# Patient Record
Sex: FEMALE | HISPANIC OR LATINO | Employment: FULL TIME | URBAN - METROPOLITAN AREA
[De-identification: names, ages, dates, MRNs, and addresses within clinical notes are randomized per-mention and may not be internally consistent; named-entity substitution may affect disease eponyms.]

---

## 2017-02-01 ENCOUNTER — OFFICE VISIT (OUTPATIENT)
Dept: OBSTETRICS AND GYNECOLOGY | Facility: CLINIC | Age: 37
End: 2017-02-01
Payer: MEDICAID

## 2017-02-01 VITALS
HEIGHT: 64 IN | BODY MASS INDEX: 34.48 KG/M2 | SYSTOLIC BLOOD PRESSURE: 118 MMHG | DIASTOLIC BLOOD PRESSURE: 80 MMHG | WEIGHT: 201.94 LBS

## 2017-02-01 DIAGNOSIS — O09.522 AMA (ADVANCED MATERNAL AGE) MULTIGRAVIDA 35+, SECOND TRIMESTER: ICD-10-CM

## 2017-02-01 DIAGNOSIS — O09.32 LATE PRENATAL CARE AFFECTING PREGNANCY IN SECOND TRIMESTER: Primary | ICD-10-CM

## 2017-02-01 PROCEDURE — 99999 PR PBB SHADOW E&M-EST. PATIENT-LVL II: CPT | Mod: PBBFAC,,, | Performed by: OBSTETRICS & GYNECOLOGY

## 2017-02-01 PROCEDURE — 99212 OFFICE O/P EST SF 10 MIN: CPT | Mod: PBBFAC | Performed by: OBSTETRICS & GYNECOLOGY

## 2017-02-01 PROCEDURE — 99203 OFFICE O/P NEW LOW 30 MIN: CPT | Mod: TH,S$PBB,, | Performed by: OBSTETRICS & GYNECOLOGY

## 2017-02-01 NOTE — MR AVS SNAPSHOT
[facility]  [address] [place] [address]  Phone: [phone]  Fax: [phone]                  Yuliana Mueller   2017 9:00 AM   Office Visit    Descripción:  Female : 1980   Personal Médico:  Marcio Patel MD   Departamento:  [facility]           Razón de la britta     Possible Pregnancy           Diagnósticos de Esta Visita        Comentarios    Late prenatal care affecting pregnancy in second trimester    -  Primario     AMA (advanced maternal age) multigravida 35+, second trimester                Lista de tareas           Citas próximas        Personal Médico Departamento Tfno del dpto    2017 1:20 PM LABORATORY, [address] [facility] [phone]    2017 1:20 PM SPECIMEN, [address] [facility] [phone]    2017 1:30 PM ULTRASOUND, OB-GYN [facility]/ -[phone]    2017 2:15 PM Marcio Patel MD [facility]/ -[phone]      Metas (5 Years of Data)     Ninguna      Follow-Up and Disposition     Return in about 1 week (around 2017) for Initial OB with CNM.    Follow-up and Disposition History      [facility]     [facility] de Enfermeras - Asistencia   Enfermeras registradas de [facility] pueden ayudarle a reservar jelly britta, proveer educación para la albino, asesoría clínica, y otros servicios de asesoramiento.   Llame para alyssia servicio gratuito a [phone].             Medicamentos           Mensaje sobre Medicamentos     Verificar los cambios y / o adiciones a templeton régimen de medicación son los mismos que discutir con templeton médico. Si cualquiera de estos cambios o adiciones son incorrectos, por favor notifique a templeton proveedor de atención médica.             Verifique que la siguiente lista de medicamentos es jelly representación exacta de los medicamentos que está tomando actualmente. Si no hay ningunos reportados, la lista puede estar en ballesteros. Si no  "es correcta, por favor póngase en contacto con templeton proveedor de atención médica. Lleve esta lista con usted en neetu de emergencia.                Información de referencia clínica           Signos vitales - más recientes  Última actualización: 2/1/2017  9:19 AM por Adore Fletcher, LPN    PS [place] Pes[place] LMP (última salome) BMI (IMC)       118/80 (BP Location: Right arm, Patient Position: Sitting, BP Method: Manual) 5' 4" (1.626 m) 91.6 kg (201 lb 15.1 oz) 08/08/2016 34.66 kg/m2       Blood Pressure          Most Recent Value    BP  118/80      Alergias     A partir del:  2/1/2017        No Known Allergies      Vacunas     Administradas en la fecha de la visita:  2/1/2017        None      Orders Placed During Today's Visit     Exámenes/Procedimientos futuros Se espera el Vence    CBC auto differential  2/1/2017 2/1/2018    Hemoglobin A1c  2/1/2017 4/2/2018    Hepatitis panel, acute  2/1/2017 2/1/2018    HIV-1 and HIV-2 antibodies  2/1/2017 2/1/2018    RPR  2/1/2017 2/1/2018    Rubella antibody, IgG  2/1/2017 2/1/2018    Type & Screen  2/1/2017 2/1/2018    Urine culture  2/1/2017 2/1/2018    US OB/GYN Procedure (Viewpoint)  As directed 2/1/2018      Registrarse para [facility]     La activación de templeton cuenta [facility] es tan fácil feli 1-2-3!    1) Ir a [email or web address], seleccione Registrarse Ahora, meter el código de activación y templeton fecha de nacimiento, y seleccione Próximo.    [id number]  Expires: 3/18/2017 10:12 AM      2) Crear un nombre de usuario y contraseña para usar cuando se visita [facility] en el futuro y selecciona jelly pregunta de seguridad en neetu de que pierda templeton contraseña y seleccione Próximo.    3) Introduzca templeton dirección de correo electrónico y saray car en Registrarse!    Información Adicional  Si tiene alguna pregunta, por favor, e-mail [email or web address] o llame al [phone] para hablar con nuestro personal. Recuerde, [facility] no debe ser usada para necesidades urgentes. En neetu de " emergencia médica, [facility] 911.                 Yuliana Mueller   2017 9:00 AM   Office Visit    Description:  Female : 1980   Provider:  Marcio Patel MD   Department:  [facility]           Reason for Visit     Possible Pregnancy           Diagnoses this Visit        Comments    Late prenatal care affecting pregnancy in second trimester    -  Primary     AMA (advanced maternal age) multigravida 35+, second trimester                To Do List           Future Appointments        Provider Department Dept Phone    2017 1:20 PM LABORATORY, [address] [facility] [phone]    2017 1:20 PM SPECIMEN, [address] [facility] [phone]    2017 1:30 PM ULTRASOUND, OB-GYN [facility] OB/ -[phone]    2017 2:15 PM Marcio Patel MD [facility] OB/ -[phone]      Goals     None      Follow-Up and Disposition     Return in about 1 week (around 2017) for Initial OB with CNM.    Follow-up and Disposition History      [facility]     [facility] Nurse Care [facility] -  Assistance  Registered nurses in the [facility] provide clinical advisement, health education, appointment booking, and other advisory services.  Call for this free service at [phone].             Medications           Message regarding Medications     Verify the changes and/or additions to your medication regime listed below are the same as discussed with your clinician today.  If any of these changes or additions are incorrect, please notify your healthcare provider.             Verify that the below list of medications is an accurate representation of the medications you are currently taking.  If none reported, the list may be blank. If incorrect, please contact your healthcare provider. Carry this list with you in case of emergency.                Clinical Reference Information           Vital Signs - Last Recorded  Most recent update:  "2/1/2017  9:19 AM by Adore Fletcher LPN    BP Ht Wt LMP BMI       118/80 (BP Location: Right arm, Patient Position: Sitting, BP Method: Manual) 5' 4" (1.626 m) 91.6 kg (201 lb 15.1 oz) 08/08/2016 34.66 kg/m2       Blood Pressure          Most Recent Value    BP  118/80      Allergies as of 2/1/2017     No Known Allergies      Immunizations Administered on Date of Encounter - 2/1/2017     None      Orders Placed During Today's Visit     Future Labs/Procedures Expected by Expires    CBC auto differential  2/1/2017 2/1/2018    Hemoglobin A1c  2/1/2017 4/2/2018    Hepatitis panel, acute  2/1/2017 2/1/2018    HIV-1 and HIV-2 antibodies  2/1/2017 2/1/2018    RPR  2/1/2017 2/1/2018    Rubella antibody, IgG  2/1/2017 2/1/2018    Type & Screen  2/1/2017 2/1/2018    Urine culture  2/1/2017 2/1/2018    US OB/GYN Procedure ([facility])  As directed 2/1/2018      [facility] Sign-Up     Activating your [facility] account is as easy as 1-2-3!     1) Visit [email or web address], select Sign Up Now, enter this activation code and your date of birth, then select Next.  [email or web address]  Expires: 3/18/2017 10:12 AM      2) Create a username and password to use when you visit [facility] in the future and select a security question in case you lose your password and select Next.    3) Enter your e-mail address and click Sign Up!    Additional Information  If you have questions, please e-mail [email or web address] or call [phone] to talk to our [facility] staff. Remember, [facility] is NOT to be used for urgent needs. For medical emergencies, dial 911.           "

## 2017-02-01 NOTE — PROGRESS NOTES
CHIEF COMPLAINT:   Patient presents with      Possible Pregnancy        HISTORY OF PRESENT ILLNESS  Yuliana Mueller 36 y.o.  presents for pregnancy risk assessment.   The patient has no complaints today.  No nausea or vomiting. No bleeding or pain.  Pregnancy was not planned but is desired.  Boyfriend is supportive of pregnancy.  Lives at home with boyfriend (4 children live in [place]).  No pets at home.  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  Denies domestic abuse.  Denies chemical/pesticide/radiation exposure.  OB history:  1.   at term no complications in [place]  2.   at term no complications in [place]  3.   at term no complications in [place]  4.   at term no complications in [place]    LMP: Patient's last menstrual period was 2016.  EDC: Estimated Date of Delivery: 5/15/17  EGA: 25w2d       There are no preventive care reminders to display for this patient.    History reviewed. No pertinent past medical history.    History reviewed. No pertinent past surgical history.    History reviewed. No pertinent family history.    Social History     Social History    Marital status: Single     Spouse name: N/A    Number of children: N/A    Years of education: N/A     Social History Main Topics    Smoking status: Never Smoker    Smokeless tobacco: Never Used    Alcohol use No    Drug use: No    Sexual activity: Yes     Partners: Male     Birth control/ protection: None     Other Topics Concern    None     Social History Narrative    None       No current outpatient prescriptions on file.     No current facility-administered medications for this visit.        Review of patient's allergies indicates:  No Known Allergies      PHYSICAL EXAM   Vitals:    17 0914   BP: 118/80        PAIN SCALE: 0/10 None    PHYSICAL EXAM    ROS:  GENERAL: No fever, chills, fatigability or weight loss.  CV: Denies chest pain  PULM: Denies shortness of breath or wheezing.  ABDOMEN:  Appetite fine. No weight loss. Denies diarrhea, abdominal pain, hematemesis or blood in stool.  URINARY: No flank pain, dysuria or hematuria.  REPRODUCTIVE: No abnormal vaginal bleeding.       PE:   APPEARANCE: Well nourished, well developed, in no acute distress  CHEST: Clear to auscultation bilaterally  CV: Regular rate and rhythm  BREASTS: Symmetrical, no skin changes or visible lesions. No palpable masses, nipple discharge or adenopathy bilaterally.  ABDOMEN: Soft. No tenderness or masses. No hepatosplenomegaly. No hernias  PELVIC: Deferred      bpm    A/P:  Late prenatal care affecting pregnancy in second trimester  -     CBC auto differential; Future; Expected date: 2/1/17  -     Hepatitis panel, acute; Future; Expected date: 2/1/17  -     HIV-1 and HIV-2 antibodies; Future; Expected date: 2/1/17  -     RPR; Future; Expected date: 2/1/17  -     Rubella antibody, IgG; Future; Expected date: 2/1/17  -     Type & Screen; Future; Expected date: 2/1/17  -     Hemoglobin A1c; Future; Expected date: 2/1/17  -     Urine culture; Future; Expected date: 2/1/17  -     US OB/GYN Procedure ([facility]point); Future  -      Patient was counseled today on A.C.S. Pap guidelines and recommendations for yearly pelvic exams, mammograms and monthly self breast exams; to see her PCP for other health maintenance and pregnancy.   -      Patient's medications and medical history reviewed with patient and implications in pregnancy.   -      Pregnancy course discussed and 'AtoZ' book given. Patient was counseled on proper weight gain based on the [place] [employer]'s recommendations based on her pre-pregnancy weight. Discussed foods to avoid in pregnancy (i.e. sushi, fish that are high in mercury, deli meat, and unpasteurized cheeses). Discussed prenatal vitamin options (i.e. stool softener, DHA). Discussed potential medical problems in pregnancy.  -     Discussed risk of Toxoplasmosis transmission from pets and reviewed risk  reduction techniques.  -     Pt was counseled on exercise in pregnancy and weight gain recommendations.  -     Pt was counseled on travel recommendations and on risks of Zika virus exposure.  Current CDC Zika advisories and prevention techniques were reviewed with pt.  Pt denies any recent international travel and does not plan travel during pregnancy.  Pt reports that partner does not plan travel either.  -     Oriented to practice incl CNM collaboration.   -     Follow-up initial OB, w/labs and u/s.     AMA (advanced maternal age) multigravida 35+, second trimester  -     Discused increased risks with AMA status.  -     Chromosomal abnormality risk discussed and available testing offered - too late.     Return in about 1 week (around 2/8/2017) for Initial OB with CNM.

## 2017-02-08 ENCOUNTER — PROCEDURE VISIT (OUTPATIENT)
Dept: OBSTETRICS AND GYNECOLOGY | Facility: CLINIC | Age: 37
End: 2017-02-08
Payer: MEDICAID

## 2017-02-08 ENCOUNTER — LAB VISIT (OUTPATIENT)
Dept: LAB | Facility: HOSPITAL | Age: 37
End: 2017-02-08
Attending: OBSTETRICS & GYNECOLOGY
Payer: MEDICAID

## 2017-02-08 ENCOUNTER — INITIAL PRENATAL (OUTPATIENT)
Dept: OBSTETRICS AND GYNECOLOGY | Facility: CLINIC | Age: 37
End: 2017-02-08
Payer: MEDICAID

## 2017-02-08 VITALS
WEIGHT: 203.25 LBS | BODY MASS INDEX: 34.89 KG/M2 | DIASTOLIC BLOOD PRESSURE: 80 MMHG | SYSTOLIC BLOOD PRESSURE: 110 MMHG

## 2017-02-08 DIAGNOSIS — O09.32 LATE PRENATAL CARE AFFECTING PREGNANCY IN SECOND TRIMESTER: ICD-10-CM

## 2017-02-08 DIAGNOSIS — Z36.89 ENCOUNTER FOR FETAL ANATOMIC SURVEY: ICD-10-CM

## 2017-02-08 DIAGNOSIS — O09.32 LATE PRENATAL CARE AFFECTING PREGNANCY IN SECOND TRIMESTER: Primary | ICD-10-CM

## 2017-02-08 DIAGNOSIS — O09.522 ELDERLY MULTIGRAVIDA IN SECOND TRIMESTER: ICD-10-CM

## 2017-02-08 DIAGNOSIS — K21.9 GASTROESOPHAGEAL REFLUX DISEASE WITHOUT ESOPHAGITIS: ICD-10-CM

## 2017-02-08 LAB
ABO + RH BLD: NORMAL
BASOPHILS # BLD AUTO: 0.02 K/UL
BASOPHILS NFR BLD: 0.2 %
BLD GP AB SCN CELLS X3 SERPL QL: NORMAL
DIFFERENTIAL METHOD: ABNORMAL
EOSINOPHIL # BLD AUTO: 0.1 K/UL
EOSINOPHIL NFR BLD: 0.9 %
ERYTHROCYTE [DISTWIDTH] IN BLOOD BY AUTOMATED COUNT: 13.2 %
HCT VFR BLD AUTO: 34.9 %
HGB BLD-MCNC: 12 G/DL
LYMPHOCYTES # BLD AUTO: 1.7 K/UL
LYMPHOCYTES NFR BLD: 14.4 %
MCH RBC QN AUTO: 28.9 PG
MCHC RBC AUTO-ENTMCNC: 34.4 %
MCV RBC AUTO: 84 FL
MONOCYTES # BLD AUTO: 0.9 K/UL
MONOCYTES NFR BLD: 7.4 %
NEUTROPHILS # BLD AUTO: 8.8 K/UL
NEUTROPHILS NFR BLD: 76.8 %
PLATELET # BLD AUTO: 276 K/UL
PMV BLD AUTO: 10.3 FL
RBC # BLD AUTO: 4.15 M/UL
WBC # BLD AUTO: 11.45 K/UL

## 2017-02-08 PROCEDURE — 85025 COMPLETE CBC W/AUTO DIFF WBC: CPT

## 2017-02-08 PROCEDURE — 86850 RBC ANTIBODY SCREEN: CPT

## 2017-02-08 PROCEDURE — 99999 PR PBB SHADOW E&M-EST. PATIENT-LVL II: CPT | Mod: PBBFAC,,, | Performed by: OBSTETRICS & GYNECOLOGY

## 2017-02-08 PROCEDURE — 86592 SYPHILIS TEST NON-TREP QUAL: CPT

## 2017-02-08 PROCEDURE — 86762 RUBELLA ANTIBODY: CPT

## 2017-02-08 PROCEDURE — 80074 ACUTE HEPATITIS PANEL: CPT

## 2017-02-08 PROCEDURE — 83036 HEMOGLOBIN GLYCOSYLATED A1C: CPT

## 2017-02-08 PROCEDURE — 76805 OB US >/= 14 WKS SNGL FETUS: CPT | Mod: PBBFAC | Performed by: OBSTETRICS & GYNECOLOGY

## 2017-02-08 PROCEDURE — 86900 BLOOD TYPING SEROLOGIC ABO: CPT

## 2017-02-08 PROCEDURE — 76805 OB US >/= 14 WKS SNGL FETUS: CPT | Mod: 26,S$PBB,, | Performed by: OBSTETRICS & GYNECOLOGY

## 2017-02-08 PROCEDURE — 99213 OFFICE O/P EST LOW 20 MIN: CPT | Mod: TH,S$PBB,, | Performed by: OBSTETRICS & GYNECOLOGY

## 2017-02-08 PROCEDURE — 86703 HIV-1/HIV-2 1 RESULT ANTBDY: CPT

## 2017-02-08 NOTE — PROGRESS NOTES
US today: 1149 g (94%), cephalic, 3VC, posterior placenta, female, sub-optimal views  Pt is here for her IOB exam.  Pt reports complaints of acid reflux symptoms, especially when laying down at night.  Otherwise doing well.    Risk assessment note reviewed:  x 4  Pt counseled on reflux prevention and dietary restrictions.  Use OTC Zantac 150 mg QHS.    See physical exam tab for exam details.  Gc/Ct done.  Draw labs today and draw GTT with next visit.  Repeat ultrasound in 4 weeks for sub-optimal views.  Welcome [facility].  Coffective counseling sheet Build Your Team discussed with mother. Reinforced importance of early identification of support team including champion, OB provider, pediatrician and local community resources. Encouraged mother to download Coffective mobile maura if she has not already done so.  Mother verbalizes understanding.  Coffective counseling sheet Get Ready discussed with mother. Reinforced avoiding induction of labor unless medically indicated as well as comfort measures during labor.  Encouraged mother to download Coffective mobile maura if she has not already done so. Mother verbalizes understanding.  Coffective counseling sheet Fall In Love discussed with mother. Reinforced immediate skin to skin, the magic first hour, importance of the first feeding and delaying routine procedures. Encouraged mother to download Coffective mobile maura if she has not already done so. Mother verbalizes understanding.

## 2017-02-08 NOTE — MR AVS SNAPSHOT
[facility]  [address] [place] [address]  Phone: [phone]  Fax: [phone]                  Yuliana M Jewell Mueller   2017 2:15 PM   Initial Prenatal    Descripción:  Female : 1980   Personal Médico:  Marcio Patel MD   Departamento:  [facility]           Razón de la britta     Initial Prenatal Visit           Diagnósticos de Esta Visita        Comentarios    Late prenatal care affecting pregnancy in second trimester    -  Primario     Elderly multigravida in second trimester         Gastroesophageal reflux disease without esophagitis                Lista de tareas           Citas próximas        Personal Médico Departamento Tfno del dpto    2017 9:15 AM Marcio Patel MD [facility]/ -[phone]      Metas (5 Years of Data)     Ninguna      Follow-Up and Disposition     Return in about 2 weeks (around 2017).      [facility]     [facility] Enfermeras - Asistencia   Enfermeras registradas de [facility] pueden ayudarle a reservar jelly britta, proveer educación para la albino, asesoría clínica, y otros servicios de asesoramiento.   Llame para alyssia servicio gratuito a [phone].             Medicamentos           Mensaje sobre Medicamentos     Verificar los cambios y / o adiciones a templeton régimen de medicación son los mismos que discutir con templeton médico. Si cualquiera de estos cambios o adiciones son incorrectos, por favor notifique a templeton proveedor de atención médica.             Verifique que la siguiente lista de medicamentos es jelly representación exacta de los medicamentos que está tomando actualmente. Si no hay ningunos reportados, la lista puede estar en ballesteros. Si no es correcta, por favor póngase en contacto con templeton proveedor de atención médica. Lleve esta lista con usted en neetu de emergencia.                Información de referencia clínica           Prenatal Vitals     Enc. Date GA Prenatal Vitals Prenatal Pulse  Pain Level Urine Albumin/Glucose Edema Presentation Dilation/Effacement/Station    2/8/17 26w2d 110/80 / 92.2 kg (203 lb 4.2 oz)  / us 140 / Present  0           Number of fetuses: 1       Frida signos vitales emilia     PS Peso Ultima menstruación BMI (IMC)          110/80 92.2 kg (203 lb 4.2 oz) 08/08/2016 34.89 kg/m2        Alergias     A partir del:  2/8/2017        No Known Allergies      Vacunas     Administradas en la fecha de la visita:  2/8/2017        None      Orders Placed During Today's Visit     Exámenes/Procedimientos futuros Se espera el Vence    OB Glucose Screen  2/8/2017 4/9/2018      Registrarse para [facility]     La activación de templeton cuenta [facility] es tan fácil feli 1-2-3!    1) Ir a [email or web address], seleccione Registrarse Ahora, meter el código de activación y templeton fecha de nacimiento, y seleccione Próximo.    [id number]  Expires: 3/18/2017 10:12 AM      2) Crear un nombre de usuario y contraseña para usar cuando se visita [facility] en el futuro y selecciona jelly pregunta de seguridad en neetu de que pierda templeton contraseña y seleccione Próximo.    3) Introduzca templeton dirección de correo electrónico y saray [facility] en Registrarse!    Información Adicional  Si tiene alguna pregunta, por favor, e-mail [email or web address] o llame al [phone] para hablar con nuestro personal. Recuerde, [facility] no debe ser usada para necesidades urgentes. En neetu de emergencia médica, llame al 911.        Language Assistance Services     ATTENTION: Language assistance services are available, free of charge. Please call [phone].      ATENCIÓN: Si habla español, tiene a templeton disposición servicios gratuitos de asistencia lingüística. Llame al [phone].     SANDEEP Ý: N?u b?n nói Ti?ng Vi?t, có các d?ch v? h? tr? ngôn ng? mi?n phí dành cho b?n. G?i s? [phone].         [facility] cumple con las leyes federales aplicables de derechos civiles y no discrimina por motivos de tracey, color, origen nacional,  magali, garcia, o sexo.                 Yuliana Corcoran Ervin   2017 2:15 PM   Initial Prenatal    Description:  Female : 1980   Provider:  Marcio Patel MD   Department:  [facility]           Reason for Visit     Initial Prenatal Visit           Diagnoses this Visit        Comments    Late prenatal care affecting pregnancy in second trimester    -  Primary     Elderly multigravida in second trimester         Gastroesophageal reflux disease without esophagitis                To Do List           Future Appointments        Provider Department Dept Phone    2017 9:15 AM Marcio Patel MD [facility]/ -[phone]      Goals     None      Follow-Up and Disposition     Return in about 2 weeks (around 2017).      [facility]     [facility] On [facility] Nurse Care Line -  Assistance  Registered nurses in the [facility] provide clinical advisement, health education, appointment booking, and other advisory services.  Call for this free service at [phone].             Medications           Message regarding Medications     Verify the changes and/or additions to your medication regime listed below are the same as discussed with your clinician today.  If any of these changes or additions are incorrect, please notify your healthcare provider.             Verify that the below list of medications is an accurate representation of the medications you are currently taking.  If none reported, the list may be blank. If incorrect, please contact your healthcare provider. Carry this list with you in case of emergency.                Clinical Reference Information           Prenatal Vitals     Enc. Date GA Prenatal Vitals Prenatal Pulse Pain Level Urine Albumin/Glucose Edema Presentation Dilation/Effacement/Station    17 26w2d 110/80 / 92.2 kg (203 lb 4.2 oz)  / us 140 / Present  0           Number of fetuses: 1       Your Vitals Were     BP Weight Last Period BMI           110/80 92.2 kg (203 lb 4.2 oz) 08/08/2016 34.89 kg/m2        Allergies as of 2/8/2017     No Known Allergies      Immunizations Administered on Date of Encounter - 2/8/2017     None      Orders Placed During Today's Visit     Future Labs/Procedures Expected by Expires    OB Glucose Screen  2/8/2017 4/9/2018      [facility] Sign-Up     Activating your [facility] account is as easy as 1-2-3!     1) Visit [email or web address], select Sign Up Now, enter this activation code and your date of birth, then select Next.  [email or web address]  Expires: 3/18/2017 10:12 AM      2) Create a username and password to use when you visit [facility] in the future and select a security question in case you lose your password and select Next.    3) Enter your e-mail address and click Sign Up!    Additional Information  If you have questions, please e-mail [email or web address] or call [phone] to talk to our [facility] staff. Remember, [facility] is NOT to be used for urgent needs. For medical emergencies, dial 911.         Language Assistance Services     ATTENTION: Language assistance services are available, free of charge. Please call [phone].      ATENCIÓN: Si habla español, tiene a templeton disposición servicios gratuitos de asistencia lingüística. Llame al [phone].     CHÚ Ý: N?u b?n nói Ti?ng Vi?t, có các d?ch v? h? tr? ngôn ng? mi?n phí dành cho b?n. G?i s? [phone].         [facility] complies with applicable Federal civil rights laws and does not discriminate on the basis of race, color, national origin, age, disability, or sex.

## 2017-02-09 LAB
ESTIMATED AVG GLUCOSE: 97 MG/DL
HAV IGM SERPL QL IA: NEGATIVE
HBA1C MFR BLD HPLC: 5 %
HBV CORE IGM SERPL QL IA: NEGATIVE
HBV SURFACE AG SERPL QL IA: NEGATIVE
HCV AB SERPL QL IA: NEGATIVE
HIV 1+2 AB+HIV1 P24 AG SERPL QL IA: NEGATIVE
RPR SER QL: NORMAL
RUBV IGG SER-ACNC: 26.8 IU/ML
RUBV IGG SER-IMP: REACTIVE

## 2017-02-10 LAB
C TRACH DNA SPEC QL NAA+PROBE: NEGATIVE
N GONORRHOEA DNA SPEC QL NAA+PROBE: NEGATIVE

## 2017-02-17 ENCOUNTER — TELEPHONE (OUTPATIENT)
Dept: OBSTETRICS AND GYNECOLOGY | Facility: CLINIC | Age: 37
End: 2017-02-17

## 2017-02-17 NOTE — TELEPHONE ENCOUNTER
Attempted to reach patient. Patient does not have phone. Advised  that her lab work was normal , she will relate the message to the patient.

## 2017-02-17 NOTE — TELEPHONE ENCOUNTER
----- Message from Marcio Patel MD sent at 2/10/2017  1:13 PM CST -----  Please contact pt to inform that test results are within normal range.  Keep scheduled appt.

## 2017-02-24 ENCOUNTER — ROUTINE PRENATAL (OUTPATIENT)
Dept: OBSTETRICS AND GYNECOLOGY | Facility: CLINIC | Age: 37
End: 2017-02-24
Payer: MEDICAID

## 2017-02-24 VITALS
WEIGHT: 203.25 LBS | SYSTOLIC BLOOD PRESSURE: 120 MMHG | DIASTOLIC BLOOD PRESSURE: 76 MMHG | BODY MASS INDEX: 34.89 KG/M2

## 2017-02-24 DIAGNOSIS — O09.33 LATE PRENATAL CARE AFFECTING PREGNANCY IN THIRD TRIMESTER: Primary | ICD-10-CM

## 2017-02-24 DIAGNOSIS — O09.523 ELDERLY MULTIGRAVIDA IN THIRD TRIMESTER: ICD-10-CM

## 2017-02-24 DIAGNOSIS — Z23 NEED FOR TDAP VACCINATION: ICD-10-CM

## 2017-02-24 PROCEDURE — 99212 OFFICE O/P EST SF 10 MIN: CPT | Mod: TH,S$PBB,, | Performed by: OBSTETRICS & GYNECOLOGY

## 2017-02-24 PROCEDURE — 90715 TDAP VACCINE 7 YRS/> IM: CPT | Mod: PBBFAC,PO | Performed by: OBSTETRICS & GYNECOLOGY

## 2017-02-24 PROCEDURE — 99999 PR PBB SHADOW E&M-EST. PATIENT-LVL II: CPT | Mod: PBBFAC,,, | Performed by: OBSTETRICS & GYNECOLOGY

## 2017-02-24 PROCEDURE — 99212 OFFICE O/P EST SF 10 MIN: CPT | Mod: PBBFAC,PO | Performed by: OBSTETRICS & GYNECOLOGY

## 2017-02-24 PROCEDURE — 90471 IMMUNIZATION ADMIN: CPT | Mod: PBBFAC,PO | Performed by: OBSTETRICS & GYNECOLOGY

## 2017-02-24 NOTE — PROGRESS NOTES
Pt reports no complaints.  Doing well.   Labs reviewed.  Pt did not complete GTT.  Will need to have this done soon.    3rd trimester talk.  Pt counseled on Tdap.  Desires to get it today.  Labor precautions and kick counts.

## 2017-02-24 NOTE — MR AVS SNAPSHOT
[facility] - OB/ GYN  [address] [place] [address]  Phone: [phone]  Fax: [phone]                  Yuliana M Jewell Mueller   2017 10:30 AM   Routine Prenatal    Descripción:  Female : 1980   Personal Médico:  Marcio Patel MD   Departamento:  [facility] - [facility]/ [facility]           Razón de la britta     Routine Prenatal Visit           Diagnósticos de Esta Visita        Comentarios    Late prenatal care affecting pregnancy in third trimester    -  Primario     Elderly multigravida in third trimester                Lista de tareas           Citas próximas        Personal Médico Departamento Tfno del [facility]    3/3/2017 10:50 AM LABORATORY, [facility] [phone]    3/10/2017 1:00 PM Marcio Patel MD [facility] OB/ -[phone]      Metas (5 Years of Data)     Ninguna      Follow-Up and Disposition     Return in about 2 weeks (around 3/10/2017).      [facility]     [facility] Línea de Enfermeras - Asistencia   Enfermeras registradas de [facility] pueden ayudarle a reservar jelly britta, proveer educación para la albino, asesoría clínica, y otros servicios de asesoramiento.   Llame para alyssia servicio gratuito a [phone].             Medicamentos           Mensaje sobre Medicamentos     Verificar los cambios y / o adiciones a templeton régimen de medicación son los mismos que discutir con templeton médico. Si cualquiera de estos cambios o adiciones son incorrectos, por favor notifique a templeton proveedor de atención médica.             Verifique que la siguiente lista de medicamentos es jelly representación exacta de los medicamentos que está tomando actualmente. Si no hay ningunos reportados, la lista puede estar en ballesteros. Si no es correcta, por favor póngase en contacto con templeton proveedor de atención médica. Lleve esta lista con usted en neetu de emergencia.                Información de referencia clínica           Prenatal Vitals     Enc. Date GA Prenatal Vitals Prenatal  Pulse Pain Level Urine Albumin/Glucose Edema Presentation Dilation/Effacement/Station    2/24/17 28w4d 120/76 / 92.2 kg (203 lb 4.2 oz)  / 140 / Present          2/8/17 26w2d 110/80 / 92.2 kg (203 lb 4.2 oz)  / us 140 / Present  0           Number of fetuses: 1       Frida signos vitales emilia     PS                   120/76           Alergias     A partir del:  2/24/2017        No Known Allergies      Vacunas     Administradas en la fecha de la visita:  2/24/2017        None      Registrarse para [facility]     La activación de templeton cuenta [facility] es tan fácil feli 1-2-3!    1) Ir a [email or web address], seleccione Registrarse Ahora, meter el código de activación y templeton fecha de nacimiento, y seleccione Próximo.    [id number]  Expires: 3/18/2017 10:12 AM      2) Crear un nombre de usuario y contraseña para usar cuando se visita [facility] en el futuro y selecciona jelly pregunta de seguridad en neetu de que pierda templeton contraseña y seleccione Próximo.    3) Introduzca templeton dirección de correo electrónico y saray clic en Registrarse!    Información Adicional  Si tiene alguna pregunta, por favor, e-mail [email or web address] o llame al [phone] para hablar con nuestro personal. Recuerde, [facility] no debe ser usada para necesidades urgentes. En neetu de emergencia médica, llame al 911.        Language Assistance Services     ATTENTION: Language assistance services are available, free of charge. Please call [phone].      ATENCIÓN: Si habla español, tiene a templeton disposición servicios gratuitos de asistencia lingüística. Llame al [phone].     CHÚ Ý: N?u b?n nói Ti?ng Vi?t, có các d?ch v? h? tr? ngôn ng? mi?n phí dành cho b?n. G?i s? [phone].         [facility] - [facility]/ [facility] cumple con las leyes federales aplicables de derechos civiles y no discrimina por motivos de tracey, color, origen nacional, edad, discapacidad, o sexo.                 Yuliana Muellre   2/24/2017 10:30 AM   Routine Prenatal    Description:   Female : 1980   Provider:  Mracio Patel MD   Department:  [facility] - OB/ GYN           Reason for Visit     Routine Prenatal Visit           Diagnoses this Visit        Comments    Late prenatal care affecting pregnancy in third trimester    -  Primary     Elderly multigravida in third trimester                To Do List           Future Appointments        Provider Department Dept Phone    3/3/2017 10:50 AM LABORATORY, [facility] [phone]    3/10/2017 1:00 PM Marcio Patel MD [facility] OB/ -[phone]      Goals     None      Follow-Up and Disposition     Return in about 2 weeks (around 3/10/2017).      [facility]     [facility] Nurse Care Line -  Assistance  Registered nurses in the [facility] provide clinical advisement, health education, appointment booking, and other advisory services.  Call for this free service at [phone].             Medications           Message regarding Medications     Verify the changes and/or additions to your medication regime listed below are the same as discussed with your clinician today.  If any of these changes or additions are incorrect, please notify your healthcare provider.             Verify that the below list of medications is an accurate representation of the medications you are currently taking.  If none reported, the list may be blank. If incorrect, please contact your healthcare provider. Carry this list with you in case of emergency.                Clinical Reference Information           Prenatal Vitals     Enc. Date GA Prenatal Vitals Prenatal Pulse Pain Level Urine Albumin/Glucose Edema Presentation Dilation/Effacement/Station    17 28w4d 120/76 / 92.2 kg (203 lb 4.2 oz)  / 140 / Present          17 26w2d 110/80 / 92.2 kg (203 lb 4.2 oz)  / us 140 / Present  0           Number of fetuses: 1       Your Vitals Were     BP                   120/76           Allergies as of 2017      No Known Allergies      Immunizations Administered on Date of Encounter - 2/24/2017     None      [facility] Sign-Up     Activating your [facility] account is as easy as 1-2-3!     1) Visit [email or web address], select Sign Up Now, enter this activation code and your date of birth, then select Next.  [email or web address]  Expires: 3/18/2017 10:12 AM      2) Create a username and password to use when you visit [facility] in the future and select a security question in case you lose your password and select Next.    3) Enter your e-mail address and click Sign Up!    Additional Information  If you have questions, please e-mail [email or web address] or call [phone] to talk to our [facility] staff. Remember, [facility] is NOT to be used for urgent needs. For medical emergencies, dial 911.         Language Assistance Services     ATTENTION: Language assistance services are available, free of charge. Please call [phone].      ATENCIÓN: Si habla español, tiene a templeton disposición servicios gratuitos de asistencia lingüística. Llame al [phone].     CHÚ Ý: N?u b?n nói Ti?ng Vi?t, có các d?ch v? h? tr? ngôn ng? mi?n phí dành cho b?n. G?i s? [phone].         [facility] - [facility]/ [facility] complies with applicable Federal civil rights laws and does not discriminate on the basis of race, color, national origin, age, disability, or sex.

## 2017-03-06 ENCOUNTER — LAB VISIT (OUTPATIENT)
Dept: LAB | Facility: HOSPITAL | Age: 37
End: 2017-03-06
Attending: OBSTETRICS & GYNECOLOGY
Payer: MEDICAID

## 2017-03-06 DIAGNOSIS — O09.32 LATE PRENATAL CARE AFFECTING PREGNANCY IN SECOND TRIMESTER: ICD-10-CM

## 2017-03-06 LAB — GLUCOSE SERPL-MCNC: 119 MG/DL

## 2017-03-06 PROCEDURE — 82950 GLUCOSE TEST: CPT

## 2017-03-06 PROCEDURE — 36415 COLL VENOUS BLD VENIPUNCTURE: CPT | Mod: PO

## 2017-03-21 ENCOUNTER — ROUTINE PRENATAL (OUTPATIENT)
Dept: OBSTETRICS AND GYNECOLOGY | Facility: CLINIC | Age: 37
End: 2017-03-21
Payer: MEDICAID

## 2017-03-21 VITALS
SYSTOLIC BLOOD PRESSURE: 110 MMHG | BODY MASS INDEX: 35.65 KG/M2 | DIASTOLIC BLOOD PRESSURE: 82 MMHG | WEIGHT: 207.69 LBS

## 2017-03-21 DIAGNOSIS — O09.523 ELDERLY MULTIGRAVIDA IN THIRD TRIMESTER: ICD-10-CM

## 2017-03-21 DIAGNOSIS — O09.33 LATE PRENATAL CARE AFFECTING PREGNANCY IN THIRD TRIMESTER: Primary | ICD-10-CM

## 2017-03-21 PROCEDURE — 99999 PR PBB SHADOW E&M-EST. PATIENT-LVL II: CPT | Mod: PBBFAC,,, | Performed by: OBSTETRICS & GYNECOLOGY

## 2017-03-21 PROCEDURE — 99212 OFFICE O/P EST SF 10 MIN: CPT | Mod: TH,S$PBB,, | Performed by: OBSTETRICS & GYNECOLOGY

## 2017-03-21 PROCEDURE — 99212 OFFICE O/P EST SF 10 MIN: CPT | Mod: PBBFAC,PO | Performed by: OBSTETRICS & GYNECOLOGY

## 2017-03-21 NOTE — PROGRESS NOTES
Pt reports no complaints.  Doing well.  Hospital delivery consents reviewed with pt and signed.  Begin weekly BPP with next visit (AMA).  Pt counseled on PP fertility planning.  Pt plans on following with health unit as she will lose insurance coverage promptly after delivery.  Labor precautions and kick counts.  Coffective counseling sheet Nourish discussed with mother. Reinforced basic breastfeeding position and latch as well as proper hand expression technique. Encouraged mother to download Coffective mobile maura if she has not already done so.  Mother verbalizes understanding.

## 2017-03-21 NOTE — MR AVS SNAPSHOT
[facility] OB/ GYN  [address]  [place] [address]  Phone: [phone]  Fax: [phone]                  Yuliana Corcoran Ervin   3/21/2017 2:15 PM   Routine Prenatal    Descripción:  Female : 1980   Personal Médico:  Marcio Patel MD   Departamento:  [facility] - [facility]/ [facility]           Razón de la britta     Routine Prenatal Visit           Diagnósticos de Esta Visita        Comentarios    Late prenatal care affecting pregnancy in third trimester    -  Primario     Elderly multigravida in third trimester                Lista de tareas           Citas próximas        Personal Médico Departamento Tfno del dpto    3/21/2017 2:15 PM Marcio Patel MD [facility] OB/ -[phone]    2017 1:00 PM ULTRASOUND, OB-GYN [facility] OB/ -[phone]    2017 1:30 PM Marcio Patel MD [facility] OB/ -[phone]      Metas (5 Years of Data)     Ninguna      Follow-Up and Disposition     Return in about 2 weeks (around 2017).      [facility]     [facility] Línea de Enfermeras - Asistencia   Enfermeras registradas de [facility] pueden ayudarle a reservar jelly britta, proveer educación para la albino, asesoría clínica, y otros servicios de asesoramiento.   Llame para alyssia servicio gratuito a [phone].             Medicamentos           Mensaje sobre Medicamentos     Verificar los cambios y / o adiciones a templeton régimen de medicación son los mismos que discutir con templeton médico. Si cualquiera de estos cambios o adiciones son incorrectos, por favor notifique a templeton proveedor de atención médica.             Verifique que la siguiente lista de medicamentos es jelly representación exacta de los medicamentos que está tomando actualmente. Si no hay ningunos reportados, la lista puede estar en ballesteros. Si no es correcta, por favor póngase en contacto con templeton proveedor de atención médica. Lleve esta lista con usted en neetu de emergencia.                Información de referencia clínica            Prenatal Vitals     Enc. Date GA Prenatal Vitals Prenatal Pulse Pain Level Urine Albumin/Glucose Edema Presentation Dilation/Effacement/Station    3/21/17 32w1d 110/82 / 94.2 kg (207 lb 10.8 oz)  / 140 / Present  0        2/24/17 28w4d 120/76 / 92.2 kg (203 lb 4.2 oz)  / 140 / Present          2/8/17 26w2d 110/80 / 92.2 kg (203 lb 4.2 oz)  / us 140 / Present  0           Number of fetuses: 1       Frida signos vitales emilia     PS Peso Ultima menstruación BMI (IMC)          110/82 94.2 kg (207 lb 10.8 oz) 08/08/2016 35.65 kg/m2        Alergias     A partir del:  3/21/2017        No Known Allergies      Vacunas     Administradas en la fecha de la visita:  3/21/2017        None      Orders Placed During Today's Visit     Exámenes de laboratorio recurrentes Intervalo Vence    US OB/GYN Procedure ([facility])  Once a week until 3/21/2018 3/21/2018      Registrarse para [facility]     La activación de templeton cuenta [facility] es tan fácil feli 1-2-3!    1) Ir a [email or web address], seleccione Registrarse Ahora, meter el código de activación y templeton fecha de nacimiento, y seleccione Próximo.    [id number]  Expires: 5/5/2017  1:53 PM      2) Crear un nombre de usuario y contraseña para usar cuando se visita [facility] en el futuro y selecciona jelly pregunta de seguridad en neetu de que pierda templeton contraseña y seleccione Próximo.    3) Introduzca templeton dirección de correo electrónico y saray clharshil en Registrarse!    Información Adicional  Si tiene alguna pregunta, por favor, e-mail [email or web address] o llame al [phone] para hablar con nuestro personal. Recuerde, [facility] no debe ser usada para necesidades urgentes. En neetu de emergencia médica, llame al 911.        Language Assistance Services     ATTENTION: Language assistance services are available, free of charge. Please call [phone].      ATENCIÓN: Si habla español, tiene a templeton disposición servicios gratuitos de asistencia lingüística. Llame al [phone].     CHÚ Ý:  N?u b?n nói Ti?ng Vi?t, có các d?ch v? h? tr? ngôn ng? mi?n phí dành cho b?n. G?i s? [phone].         [facility] - [facility]/ G[facility] cumple con las leyes federales aplicables de derechos civiles y no discrimina por motivos de tracey, color, origen nacional, edad, discapacidad, o sexo.                 Yuliana ELLIOTT Corcoran Ervin   3/21/2017 2:15 PM   Routine Prenatal    Description:  Female : 1980   Provider:  Marcio Patel MD   Department:  [facility] - [facility]/ [facility]           Reason for Visit     Routine Prenatal Visit           Diagnoses this Visit        Comments    Late prenatal care affecting pregnancy in third trimester    -  Primary     Elderly multigravida in third trimester                To Do List           Future Appointments        Provider Department Dept Phone    3/21/2017 2:15 PM Marcio Patel MD [facility] OB/ -[phone]    2017 1:00 PM ULTRASOUND, OB-GYN [facility] OB/ -[phone]    2017 1:30 PM Marcio Patel MD [facility] OB/ -[phone]      Goals     None      Follow-Up and Disposition     Return in about 2 weeks (around 2017).      [facility]     [facility] -  Assistance  Registered nurses in the [facility] On Call Center provide clinical advisement, health education, appointment booking, and other advisory services.  Call for this free service at [phone].             Medications           Message regarding Medications     Verify the changes and/or additions to your medication regime listed below are the same as discussed with your clinician today.  If any of these changes or additions are incorrect, please notify your healthcare provider.             Verify that the below list of medications is an accurate representation of the medications you are currently taking.  If none reported, the list may be blank. If incorrect, please contact your healthcare provider. Carry this list with you in case of emergency.                Clinical Reference  Information           Prenatal Vitals     Enc. Date GA Prenatal Vitals Prenatal Pulse Pain Level Urine Albumin/Glucose Edema Presentation Dilation/Effacement/Station    3/21/17 32w1d 110/82 / 94.2 kg (207 lb 10.8 oz)  / 140 / Present  0        2/24/17 28w4d 120/76 / 92.2 kg (203 lb 4.2 oz)  / 140 / Present          2/8/17 26w2d 110/80 / 92.2 kg (203 lb 4.2 oz)  / us 140 / Present  0           Number of fetuses: 1       Your Vitals Were     BP Weight Last Period BMI          110/82 94.2 kg (207 lb 10.8 oz) 08/08/2016 35.65 kg/m2        Allergies as of 3/21/2017     No Known Allergies      Immunizations Administered on Date of Encounter - 3/21/2017     None      Orders Placed During Today's Visit     Recurring Lab Work Interval Expires    US OB/GYN Procedure ([facility])  Once a week until 3/21/2018 3/21/2018      [facility] Sign-Up     Activating your [facility] account is as easy as 1-2-3!     1) Visit [email or web address], select Sign Up Now, enter this activation code and your date of birth, then select Next.  [email or web address]  Expires: 5/5/2017  1:53 PM      2) Create a username and password to use when you visit [facility] in the future and select a security question in case you lose your password and select Next.    3) Enter your e-mail address and click Sign Up!    Additional Information  If you have questions, please e-mail [email or web address] or call [phone] to talk to our [facility] staff. Remember, [facility] is NOT to be used for urgent needs. For medical emergencies, dial 911.         Language Assistance Services     ATTENTION: Language assistance services are available, free of charge. Please call [phone].      ATENCIÓN: Si habla español, tiene a templeton disposición servicios gratuitos de asistencia lingüística. Llame al [phone].     CHÚ Ý: N?u b?n nói Ti?ng Vi?t, có các d?ch v? h? tr? ngôn ng? mi?n phí dành cho b?n. G?i s? [phone].         [facility] - [facility]/ [facility] complies with applicable Federal  civil rights laws and does not discriminate on the basis of race, color, national origin, age, disability, or sex.

## 2017-04-04 ENCOUNTER — PROCEDURE VISIT (OUTPATIENT)
Dept: OBSTETRICS AND GYNECOLOGY | Facility: CLINIC | Age: 37
End: 2017-04-04
Payer: MEDICAID

## 2017-04-04 ENCOUNTER — ROUTINE PRENATAL (OUTPATIENT)
Dept: OBSTETRICS AND GYNECOLOGY | Facility: CLINIC | Age: 37
End: 2017-04-04
Payer: MEDICAID

## 2017-04-04 VITALS — WEIGHT: 208.56 LBS | SYSTOLIC BLOOD PRESSURE: 110 MMHG | BODY MASS INDEX: 35.8 KG/M2 | DIASTOLIC BLOOD PRESSURE: 68 MMHG

## 2017-04-04 DIAGNOSIS — O09.33 LATE PRENATAL CARE AFFECTING PREGNANCY IN THIRD TRIMESTER: ICD-10-CM

## 2017-04-04 DIAGNOSIS — O09.523 ELDERLY MULTIGRAVIDA IN THIRD TRIMESTER: Primary | ICD-10-CM

## 2017-04-04 DIAGNOSIS — O09.523 ELDERLY MULTIGRAVIDA IN THIRD TRIMESTER: ICD-10-CM

## 2017-04-04 PROCEDURE — 99212 OFFICE O/P EST SF 10 MIN: CPT | Mod: TH,S$PBB,, | Performed by: OBSTETRICS & GYNECOLOGY

## 2017-04-04 PROCEDURE — 99999 PR PBB SHADOW E&M-EST. PATIENT-LVL II: CPT | Mod: PBBFAC,,, | Performed by: OBSTETRICS & GYNECOLOGY

## 2017-04-04 PROCEDURE — 76819 FETAL BIOPHYS PROFIL W/O NST: CPT | Mod: 26,S$PBB,, | Performed by: OBSTETRICS & GYNECOLOGY

## 2017-04-04 PROCEDURE — 99212 OFFICE O/P EST SF 10 MIN: CPT | Mod: PBBFAC,PO | Performed by: OBSTETRICS & GYNECOLOGY

## 2017-04-04 NOTE — MR AVS SNAPSHOT
[facility]/ [facility]  [address] [place] [address]  Phone: [phone]  Fax: [phone]                  Yuliana Mueller   2017 1:30 PM   Routine Prenatal    Descripción:  Female : 1980   Personal Médico:  Marcio Patel MD   Departamento:  [facility]           Razón de la britta     Routine Prenatal Visit           Diagnósticos de Esta Visita        Comentarios    Elderly multigravida in third trimester    -  Primario     Late prenatal care affecting pregnancy in third trimester                Lista de tareas           Citas próximas        Personal Médico Departamento Tfno del dpto    2017 1:00 PM ULTRASOUND, OB-GYN [facility] OB/ -[phone]    2017 2:15 PM Marcio Patel MD [facility] OB/ -[phone]    2017 12:30 PM ULTRASOUND, OB-GYN [facility] OB/ -[phone]    2017 1:15 PM Marcio Patel MD [facility] OB/ -[phone]      Metas (5 Years of Data)     Ninguna      Follow-Up and Disposition     Return in about 2 weeks (around 2017).      [facility]     [facility] L[facility] de Enfermeras - Asistencia   Enfermeras registradas de [facility] pueden ayudarle a reservar jelly britta, proveer educación para la albino, asesoría clínica, y otros servicios de asesoramiento.   Llame para alyssia servicio gratuito a [phone].             Medicamentos           Mensaje sobre Medicamentos     Verificar los cambios y / o adiciones a templeton régimen de medicación son los mismos que discutir con templeton médico. Si cualquiera de estos cambios o adiciones son incorrectos, por favor notifique a templeton proveedor de atención médica.             Verifique que la siguiente lista de medicamentos es jelly representación exacta de los medicamentos que está tomando actualmente. Si no hay ningunos reportados, la lista puede estar en ballesteros. Si no es correcta, por favor póngase en contacto con templeton proveedor de atención médica. Lleve esta lista con usted en  neetu de emergencia.                Información de referencia clínica           Prenatal Vitals     Enc. Date GA Prenatal Vitals Prenatal Pulse Pain Level Urine Albumin/Glucose Edema Presentation Dilation/Effacement/Station    4/4/17 35w2d 110/68 / 94.6 kg (208 lb 8.9 oz) 34 cm / us 155 / Present     Vertex     3/21/17 32w1d 110/82 / 94.2 kg (207 lb 10.8 oz)  / 140 / Present  0        2/24/17 28w4d 120/76 / 92.2 kg (203 lb 4.2 oz)  / 140 / Present          2/8/17 26w2d 110/80 / 92.2 kg (203 lb 4.2 oz)  / us 140 / Present  0           Number of fetuses: 1       Frida signos vitales emilia     PS Peso Ultima menstruación BMI (IMC)          110/68 94.6 kg (208 lb 8.9 oz) 08/08/2016 35.8 kg/m2        Alergias     A partir del:  4/4/2017        No Known Allergies      Vacunas     Administradas en la fecha de la visita:  4/4/2017        None      Registrarse para [facility]     La activación de templeton cuenta [facility] es tan fácil feli 1-2-3!    1) Ir a [email or web address], seleccione Registrarse Ahora, meter el código de activación y templeton fecha de nacimiento, y seleccione Próximo.    [id number]  Expires: 5/5/2017  1:53 PM      2) Crear un nombre de usuario y contraseña para usar cuando se visita [facility] en el futuro y selecciona jelly pregunta de seguridad en neetu de que pierda templeton contraseña y seleccione Próximo.    3) Introduzca templeton dirección de correo electrónico y saray car en Registrarse!    Información Adicional  Si tiene alguna pregunta, por favor, e-mail [email or web address] o [email or web address] al [phone] para hablar con nuestro personal. Recuerde, [facility] no debe ser usada para necesidades urgentes. En neetu de emergencia médica, llame al 911.        Language Assistance Services     ATTENTION: Language assistance services are available, free of charge. Please call [phone].      ATENCIÓN: Si habla español, tiene a templeton disposición servicios gratuitos de asistencia lingüística. Llame al [phone].     CHÚ Ý: N?u b?n nói  Ti?ng Vi?t, có các d?ch v? h? tr? ngôn ng? mi?n phí dành cho b?n. G?i s? [phone].         [facility] cumple con las leyes federales aplicables de derechos civiles y no discrimina por motivos de tracey, color, origen nacional, edad, discapacidad, o sexo.                 Yuliana ELLIOTT Corcoran Ervin   2017 1:30 PM   Routine Prenatal    Description:  Female : 1980   Provider:  Marcio Patel MD   Department:  [facility]           Reason for Visit     Routine Prenatal Visit           Diagnoses this Visit        Comments    Elderly multigravida in third trimester    -  Primary     Late prenatal care affecting pregnancy in third trimester                To Do List           Future Appointments        Provider Department Dept Phone    2017 1:00 PM ULTRASOUND, OB-GYN [facility] OB/ -[phone]    2017 2:15 PM Marcio Patel MD [facility] OB/ -[phone]    2017 12:30 PM ULTRASOUND, OB-GYN [facility] OB/ -[phone]    2017 1:15 PM Marcio Patel MD [facility] OB/ -[phone]      Goals     None      Follow-Up and Disposition     Return in about 2 weeks (around 2017).      [facility]     [facility] On Call Nurse Care Line -  Assistance  Unless otherwise directed by your provider, please contact [facility] On-Call, our nurse care line that is available for  assistance.     Registered nurses in the [facility] provide: appointment scheduling, clinical advisement, health education, and other advisory services.  Call: [phone] (toll free)               Medications           Message regarding Medications     Verify the changes and/or additions to your medication regime listed below are the same as discussed with your clinician today.  If any of these changes or additions are incorrect, please notify your healthcare provider.             Verify that the below list of medications is an accurate representation of the medications you are  currently taking.  If none reported, the list may be blank. If incorrect, please contact your healthcare provider. Carry this list with you in case of emergency.                Clinical Reference Information           Prenatal Vitals     Enc. Date GA Prenatal Vitals Prenatal Pulse Pain Level Urine Albumin/Glucose Edema Presentation Dilation/Effacement/Station    4/4/17 35w2d 110/68 / 94.6 kg (208 lb 8.9 oz) 34 cm / us 155 / Present     Vertex     3/21/17 32w1d 110/82 / 94.2 kg (207 lb 10.8 oz)  / 140 / Present  0        2/24/17 28w4d 120/76 / 92.2 kg (203 lb 4.2 oz)  / 140 / Present          2/8/17 26w2d 110/80 / 92.2 kg (203 lb 4.2 oz)  / us 140 / Present  0           Number of fetuses: 1       Your Vitals Were     BP Weight Last Period BMI          110/68 94.6 kg (208 lb 8.9 oz) 08/08/2016 35.8 kg/m2        Allergies as of 4/4/2017     No Known Allergies      Immunizations Administered on Date of Encounter - 4/4/2017     None      [facility] Sign-Up     Activating your [facility] account is as easy as 1-2-3!     1) Visit [email or web address], select Sign Up Now, enter this activation code and your date of birth, then select Next.  [email or web address]  Expires: 5/5/2017  1:53 PM      2) Create a username and password to use when you visit [facility] in the future and select a security question in case you lose your password and select Next.    3) Enter your e-mail address and click Sign Up!    Additional Information  If you have questions, please e-mail [email or web address] or call [phone] to talk to our [facility] staff. Remember, [facility] is NOT to be used for urgent needs. For medical emergencies, dial 911.         Language Assistance Services     ATTENTION: Language assistance services are available, free of charge. Please call [phone].      ATENCIÓN: Si habla español, tiene a templeton disposición servicios gratuitos de asistencia lingüística. Llame al [phone].     SANDEEP Ý: N?u b?n nói Ti?ng Vi?t, có các d?ch  v? h? tr? ngôn ng? mi?n phí sparkleh cho b?n. G?i s? [phone].         [facility] complies with applicable Federal civil rights laws and does not discriminate on the basis of race, color, national origin, age, disability, or sex.

## 2017-04-04 NOTE — PROGRESS NOTES
US today:  2887 g (65%), BPP 8/8, AMA 16.2, MVP 6.2, Cephalic (pt was redated as reported dating was not using correct MATTY; correct MATTY 5/7/17 by 27 wk ultrasound; pt initially reported incorrect LMP and now is reporting new approximate date of 7/4/16).  Pt reports no complaints.  Doing well.  Labor precautions and kick counts.  Continue weekly BPP.  GBS with next visit.

## 2017-04-11 ENCOUNTER — PROCEDURE VISIT (OUTPATIENT)
Dept: OBSTETRICS AND GYNECOLOGY | Facility: CLINIC | Age: 37
End: 2017-04-11
Payer: MEDICAID

## 2017-04-11 ENCOUNTER — ROUTINE PRENATAL (OUTPATIENT)
Dept: OBSTETRICS AND GYNECOLOGY | Facility: CLINIC | Age: 37
End: 2017-04-11
Payer: MEDICAID

## 2017-04-11 VITALS
DIASTOLIC BLOOD PRESSURE: 62 MMHG | WEIGHT: 210.75 LBS | SYSTOLIC BLOOD PRESSURE: 112 MMHG | BODY MASS INDEX: 36.18 KG/M2

## 2017-04-11 DIAGNOSIS — O09.523 ELDERLY MULTIGRAVIDA IN THIRD TRIMESTER: ICD-10-CM

## 2017-04-11 DIAGNOSIS — O09.33 LATE PRENATAL CARE AFFECTING PREGNANCY IN THIRD TRIMESTER: Primary | ICD-10-CM

## 2017-04-11 PROCEDURE — 99212 OFFICE O/P EST SF 10 MIN: CPT | Mod: PBBFAC,PO | Performed by: OBSTETRICS & GYNECOLOGY

## 2017-04-11 PROCEDURE — 87081 CULTURE SCREEN ONLY: CPT

## 2017-04-11 PROCEDURE — 99999 PR PBB SHADOW E&M-EST. PATIENT-LVL II: CPT | Mod: PBBFAC,,, | Performed by: OBSTETRICS & GYNECOLOGY

## 2017-04-11 PROCEDURE — 76819 FETAL BIOPHYS PROFIL W/O NST: CPT | Mod: 26,S$PBB,, | Performed by: OBSTETRICS & GYNECOLOGY

## 2017-04-11 PROCEDURE — 99213 OFFICE O/P EST LOW 20 MIN: CPT | Mod: TH,S$PBB,, | Performed by: OBSTETRICS & GYNECOLOGY

## 2017-04-11 NOTE — MR AVS SNAPSHOT
[facility]/ [facility]  [address]  [place] [address]  Phone: [phone]  Fax: [phone]                  Yuliana Mueller   2017 2:15 PM   Routine Prenatal    Descripción:  Female : 1980   Personal Médico:  Marcio Patel MD   Departamento:  [facility]           Razón de la britta     Routine Prenatal Visit           Diagnósticos de Esta Visita        Comentarios    Late prenatal care affecting pregnancy in third trimester    -  Primario     Elderly multigravida in third trimester                Lista de tareas           Citas próximas        Personal Médico Departamento Tfno del dpto    2017 2:15 PM Marcio Patel MD [facility] OB/ -[phone]    2017 12:30 PM ULTRASOUND, OB-GYN [facility] OB/ -[phone]    2017 1:15 PM Marcio Patel MD [facility] OB/ -[phone]      Metas (5 Years of Data)     Ninguna      Follow-Up and Disposition     Follow-up and Disposition History      [facility]     [facility] Enfermeras - Asistencia   Enfermeras registradas de [facility] pueden ayudarle a reservar jelly britta, proveer educación para la albino, asesoría clínica, y otros servicios de asesoramiento.   Llame para alyssia servicio gratuito a [phone].             Medicamentos           Mensaje sobre Medicamentos     Verificar los cambios y / o adiciones a templeton régimen de medicación son los mismos que discutir con templeton médico. Si cualquiera de estos cambios o adiciones son incorrectos, por favor notifique a templeton proveedor de atención médica.             Verifique que la siguiente lista de medicamentos es jelly representación exacta de los medicamentos que está tomando actualmente. Si no hay ningunos reportados, la lista puede estar en ballesteros. Si no es correcta, por favor póngase en contacto con templeton proveedor de atención médica. Lleve esta lista con usted en neetu de emergencia.                Información de referencia clínica           Prenatal  Vitals     Enc. Date GA Prenatal Vitals Prenatal Pulse Pain Level Urine Albumin/Glucose Edema Presentation Dilation/Effacement/Station    4/11/17 36w2d 112/62 / 95.6 kg (210 lb 12.2 oz)  / us / Present     Vertex     4/4/17 35w2d 110/68 / 94.6 kg (208 lb 8.9 oz) 34 cm / us 155 / Present     Vertex     3/21/17 32w1d 110/82 / 94.2 kg (207 lb 10.8 oz)  / 140 / Present  0        2/24/17 28w4d 120/76 / 92.2 kg (203 lb 4.2 oz)  / 140 / Present          2/8/17 26w2d 110/80 / 92.2 kg (203 lb 4.2 oz)  / us 140 / Present  0           Number of fetuses: 1       Frida signos vitales emilia     PS Peso Ultima menstruación BMI (IMC)          112/62 95.6 kg (210 lb 12.2 oz) 07/04/2016 (Within Weeks) 36.18 kg/m2        Alergias     A partir del:  4/11/2017        No Known Allergies      Vacunas     Administradas en la fecha de la visita:  4/11/2017        None      Orders Placed During Today's Visit      Órdenes normales de esta visita    Strep B Screen, Vaginal / Rectal       Registrarse para [facility]     La activación de templeton cuenta [facility] es tan fácil feli 1-2-3!    1) Ir a [email or web address], seleccione Registrarse Ahora, meter el código de activación y templeton fecha de nacimiento, y seleccione Próximo.    [id number]  Expires: 5/5/2017  1:53 PM      2) Crear un nombre de usuario y contraseña para usar cuando se visita [facility] en el futuro y selecciona jelly pregunta de seguridad en neetu de que pierda templeton contraseña y seleccione Próximo.    3) Introduzca templeton dirección de correo electrónico y saray clic en Registrarse!    Información Adicional  Si tiene alguna pregunta, por favor, e-mail [email or web address] o gildardo al [phone] para hablar con nuestro personal. Recuerde, [facility] no debe ser usada para necesidades urgentes. En neetu de emergencia médica, gildardo al 911.        Language Assistance Services     ATTENTION: Language assistance services are available, free of charge. Please call [phone].      ATENCIÓN: Si habla  español, tiene a templeton disposición servicios gratuitos de asistencia lingüística. Llame al [phone].     [facility] Ý: N?u b?n nói Ti?ng Vi?t, có các d?ch v? h? tr? ngôn ng? mi?n phí dành cho b?n. G?i s? [phone].         [facility] cumple con las leyes federales aplicables de derechos civiles y no discrimina por motivos de tracey, color, origen nacional, edad, discapacidad, o sexo.                 Yuliana ELLIOTT Corcoran Ervin   2017 2:15 PM   Routine Prenatal    Description:  Female : 1980   Provider:  Marcio Patel MD   Department:  [facility]           Reason for Visit     Routine Prenatal Visit           Diagnoses this Visit        Comments    Late prenatal care affecting pregnancy in third trimester    -  Primary     Elderly multigravida in third trimester                To Do List           Future Appointments        Provider Department Dept Phone    2017 2:15 PM Marcio Patel MD [facility] OB/ -[phone]    2017 12:30 PM ULTRASOUND, OB-GYN [facility] OB/ -[phone]    2017 1:15 PM Marcio Patel MD [facility] OB/ -[phone]      Goals     None      Follow-Up and Disposition     Follow-up and Disposition History      [facility]     [facility] Nurse Care Line -  Assistance  Unless otherwise directed by your provider, please contact [facility] On-Call, our nurse care line that is available for  assistance.     Registered nurses in the [facility] provide: appointment scheduling, clinical advisement, health education, and other advisory services.  Call: [phone] (toll free)               Medications           Message regarding Medications     Verify the changes and/or additions to your medication regime listed below are the same as discussed with your clinician today.  If any of these changes or additions are incorrect, please notify your healthcare provider.             Verify that the below list of medications is an accurate  representation of the medications you are currently taking.  If none reported, the list may be blank. If incorrect, please contact your healthcare provider. Carry this list with you in case of emergency.                Clinical Reference Information           Prenatal Vitals     Enc. Date GA Prenatal Vitals Prenatal Pulse Pain Level Urine Albumin/Glucose Edema Presentation Dilation/Effacement/Station    4/11/17 36w2d 112/62 / 95.6 kg (210 lb 12.2 oz)  / us / Present     Vertex     4/4/17 35w2d 110/68 / 94.6 kg (208 lb 8.9 oz) 34 cm / us 155 / Present     Vertex     3/21/17 32w1d 110/82 / 94.2 kg (207 lb 10.8 oz)  / 140 / Present  0        2/24/17 28w4d 120/76 / 92.2 kg (203 lb 4.2 oz)  / 140 / Present          2/8/17 26w2d 110/80 / 92.2 kg (203 lb 4.2 oz)  / us 140 / Present  0           Number of fetuses: 1       Your Vitals Were     BP Weight Last Period BMI          112/62 95.6 kg (210 lb 12.2 oz) 07/04/2016 (Within Weeks) 36.18 kg/m2        Allergies as of 4/11/2017     No Known Allergies      Immunizations Administered on Date of Encounter - 4/11/2017     None      Orders Placed During Today's Visit      Normal Orders This Visit    Strep B Screen, Vaginal / Rectal       [facility] Sign-Up     Activating your [facility] account is as easy as 1-2-3!     1) Visit [email or web address], select Sign Up Now, enter this activation code and your date of birth, then select Next.  [id number]  Expires: 5/5/2017  1:53 PM      2) Create a username and password to use when you visit [facility] in the future and select a security question in case you lose your password and select Next.    3) Enter your e-mail address and click Sign Up!    Additional Information  If you have questions, please e-mail [email or web address] or call [phone] to talk to our [facility] staff. Remember, [facility] is NOT to be used for urgent needs. For medical emergencies, dial 911.         Language Assistance Services     ATTENTION: Language  assistance services are available, free of charge. Please call [phone].      ATENCIÓN: Si habla amirah, tiene a templeton disposición servicios gratuitos de asistencia lingüística. Llame al [phone].     CHÚ Ý: N?u b?n nói Ti?ng Vi?t, có các d?ch v? h? tr? ngôn ng? mi?n phí dành cho b?n. G?i s? [phone].         [facility] complies with applicable Federal civil rights laws and does not discriminate on the basis of race, color, national origin, age, disability, or sex.

## 2017-04-11 NOTE — PROGRESS NOTES
US today:  BPP 8/8, AMA 10.3, MVP 6.6, cephalic  Pt reports no complaints.  Doing well.  GBS today.  Continue with weekly BPP.  Labor precautions and kick counts.

## 2017-04-15 LAB — BACTERIA SPEC AEROBE CULT: NORMAL

## 2017-04-18 ENCOUNTER — PROCEDURE VISIT (OUTPATIENT)
Dept: OBSTETRICS AND GYNECOLOGY | Facility: CLINIC | Age: 37
End: 2017-04-18
Payer: MEDICAID

## 2017-04-18 ENCOUNTER — ROUTINE PRENATAL (OUTPATIENT)
Dept: OBSTETRICS AND GYNECOLOGY | Facility: CLINIC | Age: 37
End: 2017-04-18
Payer: MEDICAID

## 2017-04-18 ENCOUNTER — TELEPHONE (OUTPATIENT)
Dept: OBSTETRICS AND GYNECOLOGY | Facility: CLINIC | Age: 37
End: 2017-04-18

## 2017-04-18 VITALS
DIASTOLIC BLOOD PRESSURE: 68 MMHG | BODY MASS INDEX: 36.56 KG/M2 | WEIGHT: 212.94 LBS | SYSTOLIC BLOOD PRESSURE: 110 MMHG

## 2017-04-18 DIAGNOSIS — O09.523 ELDERLY MULTIGRAVIDA IN THIRD TRIMESTER: ICD-10-CM

## 2017-04-18 DIAGNOSIS — O09.523 AMA (ADVANCED MATERNAL AGE) MULTIGRAVIDA 35+, THIRD TRIMESTER: ICD-10-CM

## 2017-04-18 DIAGNOSIS — Z34.80 SUPERVISION OF OTHER NORMAL PREGNANCY: Primary | ICD-10-CM

## 2017-04-18 PROCEDURE — 99212 OFFICE O/P EST SF 10 MIN: CPT | Mod: PBBFAC,PO,25 | Performed by: ADVANCED PRACTICE MIDWIFE

## 2017-04-18 PROCEDURE — 76819 FETAL BIOPHYS PROFIL W/O NST: CPT | Mod: 26,S$PBB,, | Performed by: OBSTETRICS & GYNECOLOGY

## 2017-04-18 PROCEDURE — 99212 OFFICE O/P EST SF 10 MIN: CPT | Mod: TH,S$PBB,, | Performed by: ADVANCED PRACTICE MIDWIFE

## 2017-04-18 PROCEDURE — 99999 PR PBB SHADOW E&M-EST. PATIENT-LVL II: CPT | Mod: PBBFAC,,, | Performed by: ADVANCED PRACTICE MIDWIFE

## 2017-04-18 NOTE — TELEPHONE ENCOUNTER
----- Message from Lalita Quick sent at 4/18/2017  1:37 PM CDT -----  Contact: pt   Pt needs her time changed to a morning appt for Monday,,, please call pt back at [phone]

## 2017-04-18 NOTE — MR AVS SNAPSHOT
[facility] OB/ GYN  [address] [place] [address]  Phone: [phone]  Fax: [phone]                  Yuliana Corcoran Ervin   2017 1:45 PM   Routine Prenatal    Descripción:  Female : 1980   Personal Médico:  Eileen Jimenez CNM   Departamento:  [facility]/ [facility]           Razón de la britta     Routine Prenatal Visit           Diagnósticos de Esta Visita        Comentarios    Supervision of other normal pregnancy    -  Primario     AMA (advanced maternal age) multigravida 35+, third trimester                Lista de tareas           Citas próximas        Personal Médico Departamento Tfno del dpto    2017 1:45 PM Eileen Jimenez CNM [facility] OB/ -[phone]    2017 1:00 PM ULTRASOUND, OB-GYN [facility] OB/ -[phone]    2017 2:00 PM Marcio Patel MD [facility] OB/ -[phone]      Metas (5 Years of Data)     Ninguna      Follow-Up and Disposition     Return in about 1 week (around 2017).    Follow-up and Disposition History      [facility]     [facility] Enfermeras - Asistencia   Enfermeras registradas de [facility] pueden ayudarle a reservar jelly britta, proveer educación para la albino, asesoría clínica, y otros servicios de asesoramiento.   Llame para alyssia servicio gratuito a [phone].             Medicamentos           Mensaje sobre Medicamentos     Verificar los cambios y / o adiciones a templeton régimen de medicación son los mismos que discutir con templeton médico. Si cualquiera de estos cambios o adiciones son incorrectos, por favor notifique a templeton proveedor de atención médica.             Verifique que la siguiente lista de medicamentos es jelly representación exacta de los medicamentos que está tomando actualmente. Si no hay ningunos reportados, la lista puede estar en ballesteros. Si no es correcta, por favor póngase en contacto con templeton proveedor de atención médica. Lleve esta lista con usted en neetu de emergencia.                 Información de referencia clínica           Prenatal Vitals     Enc. Date GA Prenatal Vitals Prenatal Pulse Pain Level Urine Albumin/Glucose Edema Presentation Dilation/Effacement/Station    4/18/17 37w2d 110/68 / 96.6 kg (212 lb 15.4 oz) 37 cm / us 148 / Present  0   Vertex     4/11/17 36w2d 112/62 / 95.6 kg (210 lb 12.2 oz)  / us / Present     Vertex     4/4/17 35w2d 110/68 / 94.6 kg (208 lb 8.9 oz) 34 cm / us 155 / Present     Vertex     3/21/17 32w1d 110/82 / 94.2 kg (207 lb 10.8 oz)  / 140 / Present  0        2/24/17 28w4d 120/76 / 92.2 kg (203 lb 4.2 oz)  / 140 / Present          2/8/17 26w2d 110/80 / 92.2 kg (203 lb 4.2 oz)  / us 140 / Present  0           Number of fetuses: 1       Frida signos vitales emilia     PS Peso Ultima menstruación BMI (IMC)          110/68 96.6 kg (212 lb 15.4 oz) 07/04/2016 (Within Weeks) 36.56 kg/m2        Alergias     A partir del:  4/18/2017        No Known Allergies      Vacunas     Administradas en la fecha de la visita:  4/18/2017        None      Registrarse para [facility]     La activación de templeton cuenta [facility] es tan fácil feli 1-2-3!    1) Ir a [email or web address], seleccione Registrarse Ahora, meter el código de activación y templeton fecha de nacimiento, y seleccione Próximo.    [id number]  Expires: 5/5/2017  1:53 PM      2) Crear un nombre de usuario y contraseña para usar cuando se visita [facility] en el futuro y selecciona jelly pregunta de seguridad en neetu de que pierda templeton contraseña y seleccione Próximo.    3) Introduzca templeton dirección de correo electrónico y saray clic en Registrarse!    Información Adicional  Si tiene alguna pregunta, por favor, e-mail [email or web address] o lljj al [phone] para hablar con nuestro personal. Recuerde, [facility] no debe ser usada para necesidades urgentes. En neetu de emergencia médica, lljj al 911.        Language Assistance Services     ATTENTION: Language assistance services are available, free of charge. Please call  [phone].      ATENCIÓN: Si habla español, tiene a templeton disposición servicios gratuitos de asistencia lingüística. Llame al [phone].     CHÚ Ý: N?u b?n nói Ti?ng Vi?t, có các d?ch v? h? tr? ngôn ng? mi?n phí dành cho b?n. G?i s? [phone].         [facility] cumple con las leyes federales aplicables de derechos civiles y no discrimina por motivos de tracey, color, origen nacional, edad, discapacidad, o sexo.                 Yuliana Mueller   2017 1:45 PM   Routine Prenatal    Description:  Female : 1980   Provider:  Eileen Jimenez CNM   Department:  [facility]           Reason for Visit     Routine Prenatal Visit           Diagnoses this Visit        Comments    Supervision of other normal pregnancy    -  Primary     AMA (advanced maternal age) multigravida 35+, third trimester                To Do List           Future Appointments        Provider Department Dept Phone    2017 1:45 PM Eileen Jimenez CNM [facility] - OB/ -[phone]    2017 1:00 PM ULTRASOUND, OB-GYN [facility] OB/ -[phone]    2017 2:00 PM Marcio Patel MD [facility] OB/ -[phone]      Goals     None      Follow-Up and Disposition     Return in about 1 week (around 2017).    Follow-up and Disposition History      [facility]     [facility] Nurse Care Line -  Assistance  Unless otherwise directed by your provider, please contact [facility], our nurse care line that is available for  assistance.     Registered nurses in the [facility] Center provide: appointment scheduling, clinical advisement, health education, and other advisory services.  Call: [phone] (toll free)               Medications           Message regarding Medications     Verify the changes and/or additions to your medication regime listed below are the same as discussed with your clinician today.  If any of these changes or additions are incorrect, please notify your  healthcare provider.             Verify that the below list of medications is an accurate representation of the medications you are currently taking.  If none reported, the list may be blank. If incorrect, please contact your healthcare provider. Carry this list with you in case of emergency.                Clinical Reference Information           Prenatal Vitals     Enc. Date GA Prenatal Vitals Prenatal Pulse Pain Level Urine Albumin/Glucose Edema Presentation Dilation/Effacement/Station    4/18/17 37w2d 110/68 / 96.6 kg (212 lb 15.4 oz) 37 cm / us 148 / Present  0   Vertex     4/11/17 36w2d 112/62 / 95.6 kg (210 lb 12.2 oz)  / us / Present     Vertex     4/4/17 35w2d 110/68 / 94.6 kg (208 lb 8.9 oz) 34 cm / us 155 / Present     Vertex     3/21/17 32w1d 110/82 / 94.2 kg (207 lb 10.8 oz)  / 140 / Present  0        2/24/17 28w4d 120/76 / 92.2 kg (203 lb 4.2 oz)  / 140 / Present          2/8/17 26w2d 110/80 / 92.2 kg (203 lb 4.2 oz)  / us 140 / Present  0           Number of fetuses: 1       Your Vitals Were     BP Weight Last Period BMI          110/68 96.6 kg (212 lb 15.4 oz) 07/04/2016 (Within Weeks) 36.56 kg/m2        Allergies as of 4/18/2017     No Known Allergies      Immunizations Administered on Date of Encounter - 4/18/2017     None      [facility] Sign-Up     Activating your [facility] account is as easy as 1-2-3!     1) Visit [email or web address], select Sign Up Now, enter this activation code and your date of birth, then select Next.  [id number]  Expires: 5/5/2017  1:53 PM      2) Create a username and password to use when you visit [facility] in the future and select a security question in case you lose your password and select Next.    3) Enter your e-mail address and click Sign Up!    Additional Information  If you have questions, please e-mail [email or web address] or call [phone] to talk to our [facility] staff. Remember, [facility] is NOT to be used for urgent needs. For medical emergencies, dial  911.         Language Assistance Services     ATTENTION: Language assistance services are available, free of charge. Please call [phone].      ATENCIÓN: Si habla amirah, tiene a templeton disposición servicios gratuitos de asistencia lingüística. Llame al [phone].     CHÚ Ý: N?u b?n nói Ti?ng Vi?t, có các d?ch v? h? tr? ngôn ng? mi?n phí dành cho b?n. G?i s? [phone].         [facility] complies with applicable Federal civil rights laws and does not discriminate on the basis of race, color, national origin, age, disability, or sex.

## 2017-04-18 NOTE — PROGRESS NOTES
BPP 8/8 MVP 6.7  Baby active  Desires paraguard ordered today  Coffective counseling sheet Protect Breastfeeding discussed with mother. Reinforced avoidence of artifical nipples and formula, unless medically indicated.  Encouraged mother to download Coffective mobile maura if she has not already done so. Mother verbalizes understanding.

## 2017-04-24 ENCOUNTER — TELEPHONE (OUTPATIENT)
Dept: OBSTETRICS AND GYNECOLOGY | Facility: CLINIC | Age: 37
End: 2017-04-24

## 2017-04-24 NOTE — TELEPHONE ENCOUNTER
Patient rescheduled for appointment on 04/25 at 11am u/s and appointment to follow with Dr Patel.  Nabila voiced understanding of the time, date and location.

## 2017-04-24 NOTE — TELEPHONE ENCOUNTER
----- Message from Micah Hancock sent at 4/24/2017  8:17 AM CDT -----  Contact: [phone]  Pt would like to reschedule ob and ultrasound appt. Please call back @ [phone]. Thanks-[facility].

## 2017-04-25 ENCOUNTER — ROUTINE PRENATAL (OUTPATIENT)
Dept: OBSTETRICS AND GYNECOLOGY | Facility: CLINIC | Age: 37
End: 2017-04-25
Payer: MEDICAID

## 2017-04-25 ENCOUNTER — PROCEDURE VISIT (OUTPATIENT)
Dept: OBSTETRICS AND GYNECOLOGY | Facility: CLINIC | Age: 37
End: 2017-04-25
Payer: MEDICAID

## 2017-04-25 VITALS
DIASTOLIC BLOOD PRESSURE: 75 MMHG | BODY MASS INDEX: 36.56 KG/M2 | SYSTOLIC BLOOD PRESSURE: 110 MMHG | WEIGHT: 212.94 LBS

## 2017-04-25 DIAGNOSIS — O09.33 LATE PRENATAL CARE AFFECTING PREGNANCY IN THIRD TRIMESTER: ICD-10-CM

## 2017-04-25 DIAGNOSIS — O09.523 ELDERLY MULTIGRAVIDA IN THIRD TRIMESTER: Primary | ICD-10-CM

## 2017-04-25 DIAGNOSIS — O09.523 ELDERLY MULTIGRAVIDA IN THIRD TRIMESTER: ICD-10-CM

## 2017-04-25 PROCEDURE — 99212 OFFICE O/P EST SF 10 MIN: CPT | Mod: PBBFAC,PO | Performed by: OBSTETRICS & GYNECOLOGY

## 2017-04-25 PROCEDURE — 99999 PR PBB SHADOW E&M-EST. PATIENT-LVL II: CPT | Mod: PBBFAC,,, | Performed by: OBSTETRICS & GYNECOLOGY

## 2017-04-25 PROCEDURE — 99212 OFFICE O/P EST SF 10 MIN: CPT | Mod: TH,S$PBB,, | Performed by: OBSTETRICS & GYNECOLOGY

## 2017-04-25 PROCEDURE — 76819 FETAL BIOPHYS PROFIL W/O NST: CPT | Mod: 26,S$PBB,, | Performed by: OBSTETRICS & GYNECOLOGY

## 2017-04-25 NOTE — PROGRESS NOTES
US today: 3386g (51%), cephalic, BPP 8/8, AMA 11.2, MVP 4.4 cm  Pt reports no complaints.  Doing well.  GBS neg  Continue with weekly BPP.   Labor precautions and kick counts.

## 2017-05-02 ENCOUNTER — TELEPHONE (OUTPATIENT)
Dept: OBSTETRICS AND GYNECOLOGY | Facility: CLINIC | Age: 37
End: 2017-05-02

## 2017-05-05 ENCOUNTER — PROCEDURE VISIT (OUTPATIENT)
Dept: OBSTETRICS AND GYNECOLOGY | Facility: CLINIC | Age: 37
End: 2017-05-05
Payer: MEDICAID

## 2017-05-05 ENCOUNTER — ROUTINE PRENATAL (OUTPATIENT)
Dept: OBSTETRICS AND GYNECOLOGY | Facility: CLINIC | Age: 37
End: 2017-05-05
Payer: MEDICAID

## 2017-05-05 VITALS
WEIGHT: 214.31 LBS | SYSTOLIC BLOOD PRESSURE: 130 MMHG | DIASTOLIC BLOOD PRESSURE: 62 MMHG | BODY MASS INDEX: 36.78 KG/M2

## 2017-05-05 DIAGNOSIS — O09.33 LATE PRENATAL CARE AFFECTING PREGNANCY IN THIRD TRIMESTER: Primary | ICD-10-CM

## 2017-05-05 DIAGNOSIS — O09.523 ELDERLY MULTIGRAVIDA IN THIRD TRIMESTER: ICD-10-CM

## 2017-05-05 PROCEDURE — 76819 FETAL BIOPHYS PROFIL W/O NST: CPT | Mod: 26,S$PBB,, | Performed by: OBSTETRICS & GYNECOLOGY

## 2017-05-05 PROCEDURE — 99212 OFFICE O/P EST SF 10 MIN: CPT | Mod: PBBFAC,25,PO | Performed by: OBSTETRICS & GYNECOLOGY

## 2017-05-05 PROCEDURE — 99212 OFFICE O/P EST SF 10 MIN: CPT | Mod: TH,S$PBB,, | Performed by: OBSTETRICS & GYNECOLOGY

## 2017-05-05 PROCEDURE — 99999 PR PBB SHADOW E&M-EST. PATIENT-LVL II: CPT | Mod: PBBFAC,,, | Performed by: OBSTETRICS & GYNECOLOGY

## 2017-05-05 NOTE — MR AVS SNAPSHOT
[facility]/ [facility]  [address] [place] [address]  Phone: [phone]  Fax: [phone]                  Yuliana Mueller   2017 1:45 PM   Routine Prenatal    Descripción:  Female : 1980   Personal Médico:  Marcio Patel MD   Departamento:  [facility]           Diagnósticos de Esta Visita        Comentarios    Late prenatal care affecting pregnancy in third trimester    -  Primario     Elderly multigravida in third trimester                Lista de tareas           Citas próximas        Personal Médico Departamento Tfno del dpto    2017 2:00 PM ULTRASOUND, OB-GYN [facility] OB/ -[phone]    2017 2:15 PM Marcio Patel MD [facility] OB/ -[phone]      Metas (5 Years of Data)     Ninguna      Follow-Up and Disposition     Return in about 1 week (around 2017).    Follow-up and Disposition History      [facility] en Llamada     [facility] Enfermeras - Asistencia   Enfermeras registradas de [facility] pueden ayudarle a reservar jelly britta, proveer educación para la albino, asesoría clínica, y otros servicios de asesoramiento.   Llame para alyssia servicio gratuito a [phone].             Medicamentos           Mensaje sobre Medicamentos     Verificar los cambios y / o adiciones a templeton régimen de medicación son los mismos que discutir con templeton médico. Si cualquiera de estos cambios o adiciones son incorrectos, por favor notifique a templeton proveedor de atención médica.             Verifique que la siguiente lista de medicamentos es jelly representación exacta de los medicamentos que está tomando actualmente. Si no hay ningunos reportados, la lista puede estar en ballesteros. Si no es correcta, por favor póngase en contacto con templeton proveedor de atención médica. Lleve esta lista con usted en neetu de emergencia.                Información de referencia clínica           Prenatal Vitals     Enc. Date GA Prenatal Vitals Prenatal Pulse Pain Level Urine  Albumin/Glucose Edema Presentation Dilation/Effacement/Station    5/5/17 39w5d 130/62 / 97.2 kg (214 lb 4.6 oz) 39 cm / us 141 / Present  0  None / None / None / No Vertex     4/25/17 38w2d 110/75 / 96.6 kg (212 lb 15.4 oz) 38 cm / us 139 / Present     Vertex     4/18/17 37w2d 110/68 / 96.6 kg (212 lb 15.4 oz) 37 cm / us 148 / Present  0   Vertex     4/11/17 36w2d 112/62 / 95.6 kg (210 lb 12.2 oz)  / us / Present     Vertex     4/4/17 35w2d 110/68 / 94.6 kg (208 lb 8.9 oz) 34 cm / us 155 / Present     Vertex     3/21/17 32w1d 110/82 / 94.2 kg (207 lb 10.8 oz)  / 140 / Present  0        2/24/17 28w4d 120/76 / 92.2 kg (203 lb 4.2 oz)  / 140 / Present          2/8/17 26w2d 110/80 / 92.2 kg (203 lb 4.2 oz)  / us 140 / Present  0           Number of fetuses: 1       Frida signos vitales emilia     PS Peso Ultima menstruación BMI (IMC)          130/62 97.2 kg (214 lb 4.6 oz) 07/04/2016 (Within Weeks) 36.78 kg/m2        Alergias     A partir del:  5/5/2017        No Known Allergies      Vacunas     Administradas en la fecha de la visita:  5/5/2017        None      Registrarse para [facility]     La activación de templeton cuenta [facility] es tan fácil feli 1-2-3!    1) Ir a [email or web address], seleccione Registrarse Ahora, meter el código de activación y templeton fecha de nacimiento, y seleccione Próximo.    [id number]  Expires: 6/19/2017  2:33 PM      2) Crear un nombre de usuario y contraseña para usar cuando se visita [facility] en el futuro y selecciona jelly pregunta de seguridad en neetu de que pierda templeton contraseña y seleccione Próximo.    3) Introduzca templeton dirección de correo electrónico y saray harshil en Registrarse!    Información Adicional  Si tiene alguna pregunta, por favor, e-mail [email or web address] o llame al [phone] para hablar con nuestro personal. Recuerde, [facility] no debe ser usada para necesidades urgentes. En neetu de emergencia médica, lljj al 911.        Language Assistance Services     ATTENTION: Language  assistance services are available, free of charge. Please call [phone].      ATENCIÓN: Si habla español, tiene a templeton disposición servicios gratuitos de asistencia lingüística. Llame al [phone].     CHÚ Ý: N?u b?n nói Ti?ng Vi?t, có các d?ch v? h? tr? ngôn ng? mi?n phí dành cho b?n. G?i s? [phone].         [facility] cumple con las leyes federales aplicables de derechos civiles y no discrimina por motivos de tracey, color, origen nacional, edad, discapacidad, o sexo.                 Yuliana ELLIOTT Jewell Mueller   2017 1:45 PM   Routine Prenatal    Description:  Female : 1980   Provider:  Marcio Patel MD   Department:  [facility]           Diagnoses this Visit        Comments    Late prenatal care affecting pregnancy in third trimester    -  Primary     Elderly multigravida in third trimester                To Do List           Future Appointments        Provider Department Dept Phone    2017 2:00 PM [facility] -[phone]    2017 2:15 PM Marcio Patel MD [facility] OB/ -[phone]      Goals     None      Follow-Up and Disposition     Return in about 1 week (around 2017).    Follow-up and Disposition History      [facility]     [facility] Nurse Care Line -  Assistance  Unless otherwise directed by your provider, please contact [facility] On[facility], our nurse care line that is available for  assistance.     Registered nurses in the [facility] provide: appointment scheduling, clinical advisement, health education, and other advisory services.  Call: [phone] (toll free)               Medications           Message regarding Medications     Verify the changes and/or additions to your medication regime listed below are the same as discussed with your clinician today.  If any of these changes or additions are incorrect, please notify your healthcare provider.             Verify that the below list of  medications is an accurate representation of the medications you are currently taking.  If none reported, the list may be blank. If incorrect, please contact your healthcare provider. Carry this list with you in case of emergency.                Clinical Reference Information           Prenatal Vitals     Enc. Date GA Prenatal Vitals Prenatal Pulse Pain Level Urine Albumin/Glucose Edema Presentation Dilation/Effacement/Station    5/5/17 39w5d 130/62 / 97.2 kg (214 lb 4.6 oz) 39 cm / us 141 / Present  0  None / None / None / No Vertex     4/25/17 38w2d 110/75 / 96.6 kg (212 lb 15.4 oz) 38 cm / us 139 / Present     Vertex     4/18/17 37w2d 110/68 / 96.6 kg (212 lb 15.4 oz) 37 cm / us 148 / Present  0   Vertex     4/11/17 36w2d 112/62 / 95.6 kg (210 lb 12.2 oz)  / us / Present     Vertex     4/4/17 35w2d 110/68 / 94.6 kg (208 lb 8.9 oz) 34 cm / us 155 / Present     Vertex     3/21/17 32w1d 110/82 / 94.2 kg (207 lb 10.8 oz)  / 140 / Present  0        2/24/17 28w4d 120/76 / 92.2 kg (203 lb 4.2 oz)  / 140 / Present          2/8/17 26w2d 110/80 / 92.2 kg (203 lb 4.2 oz)  / us 140 / Present  0           Number of fetuses: 1       Your Vitals Were     BP Weight Last Period BMI          130/62 97.2 kg (214 lb 4.6 oz) 07/04/2016 (Within Weeks) 36.78 kg/m2        Allergies as of 5/5/2017     No Known Allergies      Immunizations Administered on Date of Encounter - 5/5/2017     None      [facility] Sign-Up     Activating your [facility] account is as easy as 1-2-3!     1) Visit [email or web address], select Sign Up Now, enter this activation code and your date of birth, then select Next.  [email or web address]  Expires: 6/19/2017  2:33 PM      2) Create a username and password to use when you visit [facility] in the future and select a security question in case you lose your password and select Next.    3) Enter your e-mail address and click Sign Up!    Additional Information  If you have questions, please e-mail [email or web address] or call  [phone] to talk to our [facility] staff. Remember, [facility] is NOT to be used for urgent needs. For medical emergencies, dial 911.         Language Assistance Services     ATTENTION: Language assistance services are available, free of charge. Please call [phone].      ATENCIÓN: Si habla amirah, tiene a templeton disposición servicios gratuitos de asistencia lingüística. Llame al [phone].     CHÚ Ý: N?u b?n nói Ti?ng Vi?t, có các d?ch v? h? tr? ngôn ng? mi?n phí dành cho b?n. G?i s? [phone].         [facility] complies with applicable Federal civil rights laws and does not discriminate on the basis of race, color, national origin, age, disability, or sex.

## 2017-05-05 NOTE — PROGRESS NOTES
US today: BPP 8/8, AMA 13.1, MVP 4.9, cephalic  Pt reports no complaints.  Doing well.  Declines cervix check.  Labor precautions and kick counts.  Continue weekly BPP.  Consider scheduling IOL at next visit.

## 2017-05-10 ENCOUNTER — ANESTHESIA (OUTPATIENT)
Dept: OBSTETRICS AND GYNECOLOGY | Facility: HOSPITAL | Age: 37
End: 2017-05-10
Payer: MEDICAID

## 2017-05-10 ENCOUNTER — ANESTHESIA EVENT (OUTPATIENT)
Dept: OBSTETRICS AND GYNECOLOGY | Facility: HOSPITAL | Age: 37
End: 2017-05-10
Payer: MEDICAID

## 2017-05-10 ENCOUNTER — HOSPITAL ENCOUNTER (INPATIENT)
Facility: HOSPITAL | Age: 37
LOS: 2 days | Discharge: HOME | End: 2017-05-12
Attending: OBSTETRICS & GYNECOLOGY | Admitting: OBSTETRICS & GYNECOLOGY
Payer: MEDICAID

## 2017-05-10 PROBLEM — Z23 NEED FOR TDAP VACCINATION: Status: RESOLVED | Noted: 2017-02-24 | Resolved: 2017-05-10

## 2017-05-10 LAB
ABO + RH BLD: NORMAL
BASOPHILS # BLD AUTO: 0.01 K/UL
BASOPHILS NFR BLD: 0.1 %
BLD GP AB SCN CELLS X3 SERPL QL: NORMAL
DIFFERENTIAL METHOD: ABNORMAL
EOSINOPHIL # BLD AUTO: 0.1 K/UL
EOSINOPHIL NFR BLD: 0.7 %
ERYTHROCYTE [DISTWIDTH] IN BLOOD BY AUTOMATED COUNT: 14 %
HCT VFR BLD AUTO: 33.5 %
HGB BLD-MCNC: 11.4 G/DL
HIV1+2 IGG SERPL QL IA.RAPID: NEGATIVE
LYMPHOCYTES # BLD AUTO: 2 K/UL
LYMPHOCYTES NFR BLD: 14.8 %
MCH RBC QN AUTO: 26.6 PG
MCHC RBC AUTO-ENTMCNC: 34 %
MCV RBC AUTO: 78 FL
MONOCYTES # BLD AUTO: 1.1 K/UL
MONOCYTES NFR BLD: 8.2 %
NEUTROPHILS # BLD AUTO: 10.3 K/UL
NEUTROPHILS NFR BLD: 76.2 %
PLATELET # BLD AUTO: 307 K/UL
PMV BLD AUTO: 9.8 FL
RBC # BLD AUTO: 4.29 M/UL
RPR SER QL: NORMAL
WBC # BLD AUTO: 13.45 K/UL

## 2017-05-10 PROCEDURE — 27800517 HC TRAY,EPIDURAL-CONTINUOUS: Performed by: NURSE ANESTHETIST, CERTIFIED REGISTERED

## 2017-05-10 PROCEDURE — 85025 COMPLETE CBC W/AUTO DIFF WBC: CPT

## 2017-05-10 PROCEDURE — 25000003 PHARM REV CODE 250: Performed by: ADVANCED PRACTICE MIDWIFE

## 2017-05-10 PROCEDURE — 72100002 HC LABOR CARE, 1ST 8 HOURS

## 2017-05-10 PROCEDURE — 11000001 HC ACUTE MED/SURG PRIVATE ROOM

## 2017-05-10 PROCEDURE — 86900 BLOOD TYPING SEROLOGIC ABO: CPT

## 2017-05-10 PROCEDURE — 62326 NJX INTERLAMINAR LMBR/SAC: CPT | Performed by: ANESTHESIOLOGY

## 2017-05-10 PROCEDURE — 86592 SYPHILIS TEST NON-TREP QUAL: CPT

## 2017-05-10 PROCEDURE — 86703 HIV-1/HIV-2 1 RESULT ANTBDY: CPT

## 2017-05-10 PROCEDURE — 25000003 PHARM REV CODE 250: Performed by: NURSE ANESTHETIST, CERTIFIED REGISTERED

## 2017-05-10 PROCEDURE — 59025 FETAL NON-STRESS TEST: CPT

## 2017-05-10 PROCEDURE — 86901 BLOOD TYPING SEROLOGIC RH(D): CPT

## 2017-05-10 PROCEDURE — 63600175 PHARM REV CODE 636 W HCPCS: Performed by: NURSE ANESTHETIST, CERTIFIED REGISTERED

## 2017-05-10 RX ORDER — MAG HYDROX/ALUMINUM HYD/SIMETH 200-200-20
30 SUSPENSION, ORAL (FINAL DOSE FORM) ORAL ONCE
Status: COMPLETED | OUTPATIENT
Start: 2017-05-10 | End: 2017-05-10

## 2017-05-10 RX ORDER — ROPIVACAINE HYDROCHLORIDE 2 MG/ML
INJECTION, SOLUTION EPIDURAL; INFILTRATION; PERINEURAL CONTINUOUS PRN
Status: DISCONTINUED | OUTPATIENT
Start: 2017-05-10 | End: 2017-05-11

## 2017-05-10 RX ORDER — LIDOCAINE HYDROCHLORIDE AND EPINEPHRINE 15; 5 MG/ML; UG/ML
INJECTION, SOLUTION EPIDURAL
Status: DISCONTINUED | OUTPATIENT
Start: 2017-05-10 | End: 2017-05-11

## 2017-05-10 RX ORDER — ROPIVACAINE HYDROCHLORIDE 2 MG/ML
INJECTION, SOLUTION EPIDURAL; INFILTRATION; PERINEURAL
Status: DISCONTINUED | OUTPATIENT
Start: 2017-05-10 | End: 2017-05-11

## 2017-05-10 RX ORDER — SODIUM CHLORIDE, SODIUM LACTATE, POTASSIUM CHLORIDE, CALCIUM CHLORIDE 600; 310; 30; 20 MG/100ML; MG/100ML; MG/100ML; MG/100ML
INJECTION, SOLUTION INTRAVENOUS CONTINUOUS
Status: DISCONTINUED | OUTPATIENT
Start: 2017-05-10 | End: 2017-05-11

## 2017-05-10 RX ORDER — ONDANSETRON 8 MG/1
8 TABLET, ORALLY DISINTEGRATING ORAL EVERY 8 HOURS PRN
Status: DISCONTINUED | OUTPATIENT
Start: 2017-05-10 | End: 2017-05-11

## 2017-05-10 RX ORDER — OXYTOCIN/RINGER'S LACTATE 20/1000 ML
2 PLASTIC BAG, INJECTION (ML) INTRAVENOUS CONTINUOUS
Status: DISCONTINUED | OUTPATIENT
Start: 2017-05-10 | End: 2017-05-11

## 2017-05-10 RX ADMIN — Medication 2 MILLI-UNITS/MIN: at 09:05

## 2017-05-10 RX ADMIN — ALUMINUM HYDROXIDE, MAGNESIUM HYDROXIDE, AND SIMETHICONE 30 ML: 200; 200; 20 SUSPENSION ORAL at 11:05

## 2017-05-10 RX ADMIN — ROPIVACAINE HYDROCHLORIDE 14 ML: 2 INJECTION, SOLUTION EPIDURAL; INFILTRATION at 10:05

## 2017-05-10 RX ADMIN — SODIUM CHLORIDE, SODIUM LACTATE, POTASSIUM CHLORIDE, AND CALCIUM CHLORIDE: .6; .31; .03; .02 INJECTION, SOLUTION INTRAVENOUS at 09:05

## 2017-05-10 RX ADMIN — SODIUM CHLORIDE, SODIUM LACTATE, POTASSIUM CHLORIDE, AND CALCIUM CHLORIDE: .6; .31; .03; .02 INJECTION, SOLUTION INTRAVENOUS at 10:05

## 2017-05-10 RX ADMIN — SODIUM CHLORIDE, SODIUM LACTATE, POTASSIUM CHLORIDE, AND CALCIUM CHLORIDE 1000 ML: .6; .31; .03; .02 INJECTION, SOLUTION INTRAVENOUS at 10:05

## 2017-05-10 RX ADMIN — LIDOCAINE HYDROCHLORIDE AND EPINEPHRINE 3 ML: 15; 5 INJECTION, SOLUTION EPIDURAL; INFILTRATION; INTRACAUDAL; PERINEURAL at 10:05

## 2017-05-10 RX ADMIN — ROPIVACAINE HYDROCHLORIDE 14 ML/HR: 2 INJECTION, SOLUTION EPIDURAL; INFILTRATION at 10:05

## 2017-05-10 NOTE — IP AVS SNAPSHOT
[facility]  [address] [place] [address]           Instrucciones de [place] de Pacientes  Nuestra meta es prepararlo para el éxito. Julia paquete incluye información sobre templeton condición, medicamentos e instrucciones para cuidados en el hogar. [place] le ayudará a cuidarse y prevenir la necesidad de volver al hospital.     Por favor, hable con templeton enfermero o enfermera si tiene alguna pregunta..     Hay muchos detalles a recordar cuando se prepara para salir del hospital. [place] es lo que necesita hacer:    1. [place] templeton medicina. Si usted tiene jelly receta, revise la lista de medicamentos en las siguientes páginas. Es posible que tenga nuevos medicamentos para recoger en la farmacia y otros que tendrá que dejar de luis carlos. Revise las instrucciones sobre cómo y cuándo luis carlos jesse medicamentos. Consulte con el médico o el enfermeras si no está seguro de qué hacer.    2. Ir a jesse citas de seguimiento. La información específica de seguimiento aparece en las siguientes páginas. Usted puede ser contactado por jelly enfermera o proveedor clínico sobre las próximas citas. Estar seguro de que tiene todos los números de teléfono para comunicarse si es necesario. Por favor, póngase en contacto con la oficina de templeton profesional médico si no puede hacer jelly britta.     3. Esté atento a señales de advertencia. El médico o la enfermera le dará señales de alarma detallados que debe observar y cuándo llamar para la ayuda. Estas instrucciones también pueden incluir información educativa acerca de templeton condición. Si usted experimenta cualquiera de las señales de advertencia para templeton albino, llame a templeton médico.           ** Verificar la lista de medicamentos es correcta y está actualizada. Llevar esto con usted en neetu de emergencia. Si jesse medicamentos crisostomo cambiado, por favor notifique a templeton proveedor de atención médica.             Lista de medicamentos      EMPIECE a luis carlos estos medicamentos        Additional Info                       ibuprofen 600 MG tablet   También conocido feli:  KRISTINA BETTENCOURT   Cantidad:  30 tablet   Recargas:  0   Dosis:  600 mg    Última administración:  600 mg on 5/12/2017 12:16 PM   Instrucciones:  Take 1 tablet (600 mg total) by mouth every 6 (six) hours.     Begin Date    AM    Noon    PM    Bedtime            Dónde puede recoger frida medicamentos      Estos medicamentos fueron enviados a [facility] [id number]  [place], [place] - [address] [facility]  [address] [place] [address]     Teléfono:  [phone]     ibuprofen 600 MG tablet                  Por favor traiga a todos las citas de seguimiento:    1. Katie copia de las instrucciones de kavon.  2. Todos los medicamentos que está tomando alyssia momento, en frida envases originales.  3. Identificación y tarjeta de seguro.    Por favor llegue 15 minutos antes de la hora de la britta.    Por favor llame con 24 horas de antelación si tiene que cambiar templeton britta y / o tiempo.        Frida Citas Programadas     Jun 09, 2017 11:00 AM CDT   Post Partum with Marcio Patel MD   [facility] ([facility])    [address]  [place] [address] [phone]              Información de seguimiento     Seguimiento:       Cuándo:  6/9/2017          Instrucciones a ha de kavon         Después de un parto vaginal  Después del nacimiento de templeton bebé, es posible que templeton cuerpo esté muy cansado. Recuperarse de un parto vaginal puede llevar tiempo. Quizás usted permanezca en el hospital o centro de maternidad entre les y cuatro días. También es posible que pueda regresar a templeton casa el mismo día.    Inmediatamente después del parto  Le tomarán la temperatura y la presión arterial hasta que se le estabilicen. Katie enfermera u otro proveedor de atención médica la observará mientras usted descansa. Quizás tenga roberto del posparto, que son cólicos causados por el encogimiento del útero. Se usan toallas sanitarias para  absorber el flujo del revestimiento uterino. Para garantizar que no esté sangrando demasiado, le revisarán la toalla sanitaria. Además, jelly enfermera le examinará la firmeza del útero aplicándole jelly presión suave en el estómago.  Si le pusieron anestesia, la vigilarán estrechamente hasta que usted pueda sentir y  los dedos de los pies. Si tiene dolor perineal (en la zion entre la vagina y el ano), podrá aliviárselo con jelly compresa de hielo.  El cuidado del recién nacido  Mientras usted se encuentre todavía en el hospital o centro de maternidad, le enseñarán a cargar a templeton bebé en brazos y a alimentarlo. También le darán consejos sobre los cuidados de templeton recién nacido, cómo bañar al bebé e instrucciones para amamantarlo.  Preparativos para regresar a templeton casa  Quizás esté ansiosa por irse a templeton casa lo antes posible. Antes de que la dejen irse con templeton recién nacido, un proveedor de atención médica la examinará para garantizar que usted esté lo suficientemente luis enrique feli para cuidar de templeton propia albino y la de templeton bebé. Estará lista para regresar a casa cuando:  · Pueda caminar hasta el baño y usarlo sin ayuda.  · Pueda comer alimentos sólidos y tragar pastillas (si las necesita).  · No tenga señales de infección ni de otros problemas de albino, feli fiebre.  · Tenga un adecuado control del dolor.  · Templeton sangrado no sea excesivo.  · Pueda cuidar de templeton recién nacido y esté emocionalmente estable.  Antes de salir del hospital o centro de maternidad, le darán instrucciones sobre cómo cuidar de usted en templeton hogar después de un parto vaginal. Asegúrese de seguir estas instrucciones atentamente. Si tiene preguntas o preocupaciones, hable de eso con templeton proveedor ahora.  Si le pusieron puntos  Es posible que le hayan puesto puntos en la piel cercana a templeton vagina. Los puntos pueden walt cerrado jelly episiotomía (incisión que agranda la abertura de la vagina) o reparado piel desgarrada. De cualquier forma, los puntos deberían  disolvérsele en unas cuantas semanas. Hasta nneka momento, mantenga limpios los puntos para ayudar a aliviar las molestias, facilitar la cicatrización y reducir templeton riesgo de infecciones. Podrían servirle los siguientes consejos:  · Después de orinar o evacuar jesse intestinos, límpiese desde adelante hacia atrás.  · Después de limpiarse, rocíese la zion con agua tibia. O jolanta, tome un baño de asiento. [place] significa sentarse en jelly jamel que contiene unas cuantas pulgadas de agua tibia. Por último, séquese la zion con palmaditas o con un secador de pelo en un ajuste de baja temperatura.  · No use jabón ni ninguna solución excepto agua en la zion.  · Puede ducharse a menos que le digan que no lo saray.  · Cámbiese las toallas sanitarias feli mínimo cada dos a cuatro horas.  · Aplique compresas frías o calientes en la zion, según le hayan indicado jesse proveedores de atención médica o enfermeras. Póngase jelly toalla delgada entre la compresa y la piel.  · Siéntese en asientos firmes para que los puntos se estiren menos.  Visita de control posnatal  Programe jelly visita de control posnatal con templeton proveedor de atención médica para unas seis semanas después del parto. Sanjeev alyssia examen le examinarán el útero y la zion vaginal. Póngase en contacto con templeton proveedor de atención médica si piensa que usted o templeton bebé tienen algún problema.  Cuándo llamar a templeton proveedor de atención médica  Llame inmediatamente a templeton proveedor de atención médica si tiene alguno de los siguientes síntomas:  · Fiebre de 100.4° F (38.0º C) o más kavon.  · Sangrado que empapa jelly nueva toalla sanitaria después de jelly hora o que contiene coágulos grandes.  · Dolor en la vagina que empeora y que no se yasir con los medicamentos.  · Enrojecimiento, flujo o más dolor a causa del desgarro vaginal o la episiotomía.  · Ardor, dolor, laurent valenzuela o jelly zion con nódulos en los senos junto, quizás, con síntomas similares a los de la gripe.  · Grietas, ampollas o  "ani en los pezones.  · Ardor o dolor al orinar.  · Náusea o vómito.  · Mareos o desmayos.  · Sensación de tristeza extrema o ansiedad, o falta de ganas de estar con templeton bebé.  · Dolor abdominal que no se yasir con medicamentos.  · Flujo vaginal maloliente.  · Ninguna evacuación de los intestinos en chris días.  · Dolor al orinar o incapacidad de controlar la orina.  · Enrojecimiento, sensación de calor o dolor en la parte inferior de la pierna.  · Dolor en el pecho.      Date Last Reviewed: 8/2/2015  © 7477-9324 [employer]. [address] [place] [address]. Todos los derechos reservados. Esta información no pretende sustituir la atención médica profesional. Sólo templeton médico puede diagnosticar y tratar un problema de albino.            Primary Diagnosis     Templeton diagnosis primaria es:  Normal Vaginal Delivery      Información de Admisión     Fecha y hora Proveedor Departamento CSN    5/10/2017  7:56 PM Ana Foley MD [facility] -  [id number]      Los proveedores de cuidado     Personal Médico Rol [facility] Teléfono principal de la oficina    Ana Foley MD Attending Provider Obstetrics and Gynecology [phone]      [place] signos vitales emilia     PS Pulso Temperatura Resp Virginia Peso    100/50 74 98.8 °F (37.1 °C) (Oral) 18 5' 4" (1.626 m) 96.6 kg (213 lb)    Ultima menstruación BMI (IMC)                07/04/2016 (Within Weeks) 36.56 kg/m2          Recent Lab Values        2/8/2017                           4:13 PM           A1C 5.0           Comment for A1C at  4:13 PM on 2/8/2017:  According to ADA guidelines, hemoglobin A1C <7.0% represents  optimal control in non-pregnant diabetic patients.  Different  metrics may apply to specific populations.   Standards of Medical Care in Diabetes - 2016.  For the purpose of screening for the presence of diabetes:  <5.7%     Consistent with the absence of diabetes  5.7-6.4%  Consistent with increasing risk for diabetes "   (prediabetes)  >or=6.5%  Consistent with diabetes  Currently no consensus exists for use of hemoglobin A1C  for diagnosis of diabetes for children.        Alergias     A partir del:  5/12/2017        No Known Allergies      [facility]     [facility] - 24/7    Si templeton médico no le ha indicado a lo contrário, por favor contactar a [facility], nuestra línea de cuidado de enfermeras está disponible para asistirle 24/7.    Enfermeras registradas de [facility] pueden ayudarle a reservar jelly britta, proveer educación para la albino, asesoría clínica, y otros servicios de asesoramiento.     Llame para alyssia servicio gratuito a [phone].        Directiva Anticipada     Jelly directiva anticipada es un documento que, en neetu de que ya no capaz de hacer decisiones por sí mismo, le dice a templeton equipo médico qué tipo de tratamiento quiere o no quiere recibir, o que le gustaría luis carlos esas decisiones para usted . Si actualmente no tiene jelly directiva anticipada, [facility] le anima a crear les. Para más información llame al: [phone] ([phone]), o entrando en [email or web address].        Language Assistance Services     ATTENTION: Language assistance services are available, free of charge. Please call [phone].      ATENCIÓN: Si habla español, tiene a templeton disposición servicios gratuitos de asistencia lingüística. Llame al [phone].     CHÚ Ý: N?u b?n nói Ti?ng Vi?t, có các d?ch v? h? tr? ngôn ng? mi?n phí dành cho b?n. G?i s? [phone].        Registrarse para [facility]     La activación de templeton cuenta [facility] es tan fácil feli 1-2-3!    1) Ir a [email or web address], seleccione Registrarse Ahora, meter el código de activación y templeton fecha de nacimiento, y seleccione Próximo.    [id number]  Expires: 6/19/2017  2:33 PM      2) Crear un nombre de usuario y contraseña para usar cuando se visita [facility] en el futuro y selecciona jelly pregunta de seguridad en neetu de  que pierda templeton contraseña y seleccione Próximo.    3) Introduzca templeton dirección de correo electrónico y saray car en Registrarse!    Información Adicional  Si tiene alguna pregunta, por favor, e-mail [email or web address] al [phone] para hablar con nuestro personal. Recuerde, [facility] no debe ser usada para necesidades urgentes. En neetu de emergencia médica, llame al [phone].         [facility] -  cumple con las leyes federales aplicables de derechos civiles y no discrimina por motivos de tracey, color, origen nacional, edad, discapacidad, o sexo.                        [address] [place] [address]           Patient Discharge Instructions   Our goal is to set you up for success. This packet includes information on your condition, medications, and your home care.  It will help you care for yourself to prevent having to return to the hospital.     Please ask your nurse if you have any questions.      There are many details to remember when preparing to leave the hospital. Here is what you will need to do:    1. Take your medicine. If you are prescribed medications, review your Medication List on the following pages. You may have new medications to  at the pharmacy and others that you'll need to stop taking. Review the instructions for how and when to take your medications. Talk with your doctor or nurses if you are unsure of what to do.     2. Go to your follow-up appointments. Specific follow-up information is listed in the following pages. Your may be contacted by a nurse or clinical provider about future appointments. Be sure we have all of the phone numbers to reach you. Please contact your provider's office if you are unable to make an appointment.     3. Watch for warning signs. Your doctor or nurse will give you detailed warning signs to watch for and when to call for assistance. These instructions may also include educational information about  your condition. If you experience any of warning signs to your health, call your doctor.           ** Verificar la lista de medicamentos es correcta y está actualizada. Llevar esto con usted en neetu de emergencia. Si jesse medicamentos crisostomo cambiado, por favor notifique a templeton proveedor de atención médica.             Medication List      START taking these medications        Additional Info                      ibuprofen 600 MG tablet   Commonly known as:  ADVILMOTRIN   Quantity:  30 tablet   Refills:  0   Dose:  600 mg    Last time this was given:  600 mg on 5/12/2017 12:16 PM   Instructions:  Take 1 tablet (600 mg total) by mouth every 6 (six) hours.     Begin Date    AM    Noon    PM    Bedtime            Where to Get Your Medications      These medications were sent to [facility] [id number]  [place], [place] - [address] [facility]  [phone] [facility] [place] [address]     Phone:  [phone]     ibuprofen 600 MG tablet                  Por favor traiga a todos las citas de seguimiento:    1. Katie copia de las instrucciones de kavon.  2. Todos los medicamentos que está tomando alyssia momento, en jesse envases originales.  3. Identificación y tarjeta de seguro.    Por favor llegue 15 minutos antes de la hora de la britta.    Por favor llame con 24 horas de antelación si tiene que cambiar templeton britta y / o tiempo.        Your Scheduled Appointments     Jun 09, 2017 11:00 AM CDT   Post Partum with Marcio Patel MD   [facility]    [address]  [place] [address] [phone]              Follow-up Information     Follow up In 4 weeks.          Discharge Instructions         Después de un parto vaginal  Después del nacimiento de templeton bebé, es posible que templeton cuerpo esté muy cansado. Recuperarse de un parto vaginal puede llevar tiempo. Quizás usted permanezca en el hospital o centro de maternidad entre les y cuatro días. También es posible que pueda  regresar a templeton casa el mismo día.    Inmediatamente después del parto  Le tomarán la temperatura y la presión arterial hasta que se le estabilicen. Jelly enfermera u otro proveedor de atención médica la observará mientras usted descansa. Quizás tenga roberto del posparto, que son cólicos causados por el encogimiento del útero. Se usan toallas sanitarias para absorber el flujo del revestimiento uterino. Para garantizar que no esté sangrando demasiado, le revisarán la toalla sanitaria. Además, jelly enfermera le examinará la firmeza del útero aplicándole jelly presión suave en el estómago.  Si le pusieron anestesia, la vigilarán estrechamente hasta que usted pueda sentir y  los dedos de los pies. Si tiene dolor perineal (en la zion entre la vagina y el ano), podrá aliviárselo con jelly compresa de hielo.  El cuidado del recién nacido  Mientras usted se encuentre todavía en el hospital o centro de maternidad, le enseñarán a cargar a templeton bebé en brazos y a alimentarlo. También le darán consejos sobre los cuidados de templeton recién nacido, cómo bañar al bebé e instrucciones para amamantarlo.  Preparativos para regresar a templeton casa  Quizás esté ansiosa por irse a templeton casa lo antes posible. Antes de que la dejen irse con templeton recién nacido, un proveedor de atención médica la examinará para garantizar que usted esté lo suficientemente luis enrique feli para cuidar de templeton propia albino y la de templeton bebé. Estará lista para regresar a casa cuando:  · Pueda caminar hasta el baño y usarlo sin ayuda.  · Pueda comer alimentos sólidos y tragar pastillas (si las necesita).  · No tenga señales de infección ni de otros problemas de albino, feli fiebre.  · Tenga un adecuado control del dolor.  · Templeton sangrado no sea excesivo.  · Pueda cuidar de templeton recién nacido y esté emocionalmente estable.  Antes de salir del hospital o centro de maternidad, le darán instrucciones sobre cómo cuidar de usted en templeton hogar después de un parto vaginal. Asegúrese de seguir estas  instrucciones atentamente. Si tiene preguntas o preocupaciones, hable de eso con templeton proveedor ahora.  Si le pusieron puntos  Es posible que le hayan puesto puntos en la piel cercana a templeton vagina. Los puntos pueden walt cerrado jelly episiotomía (incisión que agranda la abertura de la vagina) o reparado piel desgarrada. De cualquier forma, los puntos deberían disolvérsele en unas cuantas semanas. Hasta nneka momento, mantenga limpios los puntos para ayudar a aliviar las molestias, facilitar la cicatrización y reducir templeton riesgo de infecciones. Podrían servirle los siguientes consejos:  · Después de orinar o evacuar jesse intestinos, límpiese desde adelante hacia atrás.  · Después de limpiarse, rocíese la zion con agua tibia. O jolanta, tome un baño de asiento. [place] significa sentarse en jelly jamel que contiene unas cuantas pulgadas de agua tibia. Por último, séquese la zion con palmaditas o con un secador de pelo en un ajuste de baja temperatura.  · No use jabón ni ninguna solución excepto agua en la zion.  · Puede ducharse a menos que le digan que no lo saray.  · Cámbiese las toallas sanitarias feli mínimo cada dos a cuatro horas.  · Aplique compresas frías o calientes en la zion, según le hayan indicado jesse proveedores de atención médica o enfermeras. Póngase jelly toalla delgada entre la compresa y la piel.  · Siéntese en asientos firmes para que los puntos se estiren menos.  Visita de control posnatal  Programe jelly visita de control posnatal con templeton proveedor de atención médica para unas seis semanas después del parto. Sanjeev alyssia examen le examinarán el útero y la zion vaginal. Póngase en contacto con templeton proveedor de atención médica si piensa que usted o templeton bebé tienen algún problema.  Cuándo llamar a templeton proveedor de atención médica  Llame inmediatamente a templeton proveedor de atención médica si tiene alguno de los siguientes síntomas:  · Fiebre de 100.4° F (38.0º C) o más kavon.  · Sangrado que empapa jelly nueva toalla sanitaria  "después de jelly hora o que contiene coágulos grandes.  · Dolor en la vagina que empeora y que no se yasir con los medicamentos.  · Enrojecimiento, flujo o más dolor a causa del desgarro vaginal o la episiotomía.  · Ardor, dolor, laurent valenzuela o jelly zion con nódulos en los senos junto, quizás, con síntomas similares a los de la gripe.  · Grietas, ampollas o ani en los pezones.  · Ardor o dolor al orinar.  · Náusea o vómito.  · Mareos o desmayos.  · Sensación de tristeza extrema o ansiedad, o falta de ganas de estar con templeton bebé.  · Dolor abdominal que no se yasir con medicamentos.  · Flujo vaginal maloliente.  · Ninguna evacuación de los intestinos en chris días.  · Dolor al orinar o incapacidad de controlar la orina.  · Enrojecimiento, sensación de calor o dolor en la parte inferior de la pierna.  · Dolor en el pecho.      Date Last Reviewed: 8/2/2015  © 8752-1216 [employer]. [address] [place] [address]. Todos los derechos reservados. Esta información no pretende sustituir la atención médica profesional. Sólo templeton médico puede diagnosticar y tratar un problema de albino.            Primary Diagnosis     Your primary diagnosis was:  Normal Vaginal Delivery      Admission Information     Date & Time Provider Department CSN    5/10/2017  7:56 PM Ana Foley MD [facility] -  [id number]      Care Providers     Provider Role Specialty Primary office phone    Ana Foley MD Attending Provider Obstetrics and Gynecology [phone]      Your Vitals Were     BP Pulse Temp Resp Height Weight    100/50 74 98.8 °F (37.1 °C) (Oral) 18 5' 4" (1.626 m) 96.6 kg (213 lb)    Last Period BMI                07/04/2016 (Within Weeks) 36.56 kg/m2          Recent Lab Values        2/8/2017                           4:13 PM           A1C 5.0           Comment for A1C at  4:13 PM on 2/8/2017:  According to ADA guidelines, hemoglobin A1C <7.0% represents  optimal control in non-pregnant diabetic " patients.  Different  metrics may apply to specific populations.   Standards of Medical Care in Diabetes - 2016.  For the purpose of screening for the presence of diabetes:  <5.7%     Consistent with the absence of diabetes  5.7-6.4%  Consistent with increasing risk for diabetes   (prediabetes)  >or=6.5%  Consistent with diabetes  Currently no consensus exists for use of hemoglobin A1C  for diagnosis of diabetes for children.        Allergies as of 5/12/2017     No Known Allergies      [facility]     [facility] Nurse Care Line - 24/7 Assistance  Unless otherwise directed by your provider, please contact [facility], our nurse care line that is available for 24/7 assistance.     Registered nurses in the [facility] Center provide clinical advisement, health education, appointment booking, and other advisory services.  Call for this free service at [phone].        Advance Directives     An advance directive is a document which, in the event you are no longer able to make decisions for yourself, tells your healthcare team what kind of treatment you do or do not want to receive, or who you would like to make those decisions for you.  If you do not currently have an advance directive, [facility] encourages you to create one.  For more information call:  [phone]), [phone] ([phone]),  or log on to [email or web address].        Language Assistance Services     ATTENTION: Language assistance services are available, free of charge. Please call [phone].      ATENCIÓN: Si habla español, tiene a templeton disposición servicios gratuitos de asistencia lingüística. Llame al [phone].     CHÚ Ý: N?u b?n nói Ti?ng Vi?t, có các d?ch v? h? tr? ngôn ng? mi?n phí dành cho b?n. G?i s? [phone].        [facility] Sign-Up     Activating your [facility] account is as easy as 1-2-3!     1) Visit [email or web address], select Sign Up Now, enter this activation code and your date of  birth, then select Next.  [email or web address]  Expires: 6/19/2017  2:33 PM      2) Create a username and password to use when you visit [facility] in the future and select a security question in case you lose your password and select Next.    3) Enter your e-mail address and click Sign Up!    Additional Information  If you have questions, please e-mail [email or web address] or call [phone] to talk to our [facility] staff. Remember, [facility] is NOT to be used for urgent needs. For medical emergencies, dial 911.          [facility] complies with applicable Federal civil rights laws and does not discriminate on the basis of race, color, national origin, age, disability, or sex.

## 2017-05-11 PROCEDURE — 11000001 HC ACUTE MED/SURG PRIVATE ROOM

## 2017-05-11 PROCEDURE — 25000003 PHARM REV CODE 250: Performed by: ADVANCED PRACTICE MIDWIFE

## 2017-05-11 PROCEDURE — 72200005 HC VAGINAL DELIVERY LEVEL II

## 2017-05-11 PROCEDURE — 51701 INSERT BLADDER CATHETER: CPT

## 2017-05-11 PROCEDURE — 59409 OBSTETRICAL CARE: CPT | Mod: GB,,, | Performed by: ADVANCED PRACTICE MIDWIFE

## 2017-05-11 RX ORDER — METOCLOPRAMIDE HYDROCHLORIDE 5 MG/ML
10 INJECTION INTRAMUSCULAR; INTRAVENOUS ONCE
Status: DISCONTINUED | OUTPATIENT
Start: 2017-05-11 | End: 2017-05-11

## 2017-05-11 RX ORDER — ACETAMINOPHEN 325 MG/1
650 TABLET ORAL EVERY 6 HOURS PRN
Status: DISCONTINUED | OUTPATIENT
Start: 2017-05-11 | End: 2017-05-12 | Stop reason: HOSPADM

## 2017-05-11 RX ORDER — OXYTOCIN/RINGER'S LACTATE 20/1000 ML
36 PLASTIC BAG, INJECTION (ML) INTRAVENOUS
Status: DISCONTINUED | OUTPATIENT
Start: 2017-05-11 | End: 2017-05-12 | Stop reason: HOSPADM

## 2017-05-11 RX ORDER — DOCUSATE SODIUM 100 MG/1
100 CAPSULE, LIQUID FILLED ORAL DAILY
Status: DISCONTINUED | OUTPATIENT
Start: 2017-05-11 | End: 2017-05-12 | Stop reason: HOSPADM

## 2017-05-11 RX ORDER — SODIUM CITRATE AND CITRIC ACID MONOHYDRATE 334; 500 MG/5ML; MG/5ML
30 SOLUTION ORAL ONCE
Status: DISCONTINUED | OUTPATIENT
Start: 2017-05-11 | End: 2017-05-11

## 2017-05-11 RX ORDER — IBUPROFEN 600 MG/1
600 TABLET ORAL EVERY 6 HOURS
Status: DISCONTINUED | OUTPATIENT
Start: 2017-05-11 | End: 2017-05-12 | Stop reason: HOSPADM

## 2017-05-11 RX ORDER — DIPHENHYDRAMINE HCL 25 MG
25 CAPSULE ORAL EVERY 4 HOURS PRN
Status: DISCONTINUED | OUTPATIENT
Start: 2017-05-11 | End: 2017-05-12 | Stop reason: HOSPADM

## 2017-05-11 RX ORDER — OXYCODONE AND ACETAMINOPHEN 10; 325 MG/1; MG/1
1 TABLET ORAL EVERY 4 HOURS PRN
Status: DISCONTINUED | OUTPATIENT
Start: 2017-05-11 | End: 2017-05-12 | Stop reason: HOSPADM

## 2017-05-11 RX ORDER — ROPIVACAINE IN 0.9% SOD CHL/PF 0.2 %
PLASTIC BAG, INJECTION (ML) EPIDURAL CONTINUOUS
Status: DISCONTINUED | OUTPATIENT
Start: 2017-05-11 | End: 2017-05-11

## 2017-05-11 RX ORDER — ONDANSETRON 8 MG/1
8 TABLET, ORALLY DISINTEGRATING ORAL EVERY 8 HOURS PRN
Status: DISCONTINUED | OUTPATIENT
Start: 2017-05-11 | End: 2017-05-12 | Stop reason: HOSPADM

## 2017-05-11 RX ORDER — AMMONIA 15 % (W/V)
0.3 AMPUL (EA) INHALATION CONTINUOUS PRN
Status: DISCONTINUED | OUTPATIENT
Start: 2017-05-11 | End: 2017-05-12 | Stop reason: HOSPADM

## 2017-05-11 RX ORDER — OXYCODONE AND ACETAMINOPHEN 5; 325 MG/1; MG/1
1 TABLET ORAL EVERY 4 HOURS PRN
Status: DISCONTINUED | OUTPATIENT
Start: 2017-05-11 | End: 2017-05-12 | Stop reason: HOSPADM

## 2017-05-11 RX ORDER — FAMOTIDINE 10 MG/ML
20 INJECTION INTRAVENOUS ONCE
Status: DISCONTINUED | OUTPATIENT
Start: 2017-05-11 | End: 2017-05-11

## 2017-05-11 RX ORDER — HYDROCORTISONE 25 MG/G
CREAM TOPICAL 3 TIMES DAILY PRN
Status: DISCONTINUED | OUTPATIENT
Start: 2017-05-11 | End: 2017-05-12 | Stop reason: HOSPADM

## 2017-05-11 RX ORDER — ACETAMINOPHEN 500 MG
1000 TABLET ORAL ONCE
Status: COMPLETED | OUTPATIENT
Start: 2017-05-11 | End: 2017-05-11

## 2017-05-11 RX ADMIN — IBUPROFEN 600 MG: 600 TABLET, FILM COATED ORAL at 12:05

## 2017-05-11 RX ADMIN — IBUPROFEN 600 MG: 600 TABLET, FILM COATED ORAL at 06:05

## 2017-05-11 RX ADMIN — ACETAMINOPHEN 1000 MG: 500 TABLET ORAL at 03:05

## 2017-05-11 RX ADMIN — ONDANSETRON 8 MG: 8 TABLET, ORALLY DISINTEGRATING ORAL at 04:05

## 2017-05-11 RX ADMIN — ONDANSETRON 8 MG: 8 TABLET, ORALLY DISINTEGRATING ORAL at 03:05

## 2017-05-11 RX ADMIN — SODIUM CHLORIDE, SODIUM LACTATE, POTASSIUM CHLORIDE, AND CALCIUM CHLORIDE: .6; .31; .03; .02 INJECTION, SOLUTION INTRAVENOUS at 03:05

## 2017-05-11 NOTE — ANESTHESIA PREPROCEDURE EVALUATION
05/10/2017  Yuliana Mueller is a 36 y.o., female.    Anesthesia Evaluation    I have reviewed the Patient Summary Reports.    I have reviewed the Nursing Notes.      Review of Systems  Anesthesia Hx:  No previous Anesthesia  Denies Family Hx of Anesthesia complications.   Denies Personal Hx of Anesthesia complications.   Social:  Non-Smoker, No Alcohol Use    Hematology/Oncology:  Hematology Normal   Oncology Normal     EENT/Dental:EENT/Dental Normal   Cardiovascular:  Cardiovascular Normal Exercise tolerance: good     Pulmonary:  Pulmonary Normal    Renal/:  Renal/ Normal     Hepatic/GI:  Hepatic/GI Normal    Musculoskeletal:  Musculoskeletal Normal    Neurological:  Neurology Normal    Endocrine:  Endocrine Normal    Dermatological:  Skin Normal    Psych:  Psychiatric Normal           Physical Exam  General:  Obesity    Airway/Jaw/Neck:  Airway Findings: Mouth Opening: Normal Tongue: Normal  General Airway Assessment: Adult, Average  Mallampati: II  Improves to II with phonation.  TM Distance: Normal, at least 6 cm        Eyes/Ears/Nose:  EYES/EARS/NOSE FINDINGS: Normal   Dental:  Dental Findings: In tact   Chest/Lungs:  Chest/Lungs Findings: Normal Respiratory Rate     Heart/Vascular:  Heart Findings: Rate: Normal  Rhythm: Regular Rhythm  Heart murmur: negative Vascular Findings: Normal    Abdomen:  Abdomen Findings: Normal    Musculoskeletal:  Musculoskeletal Findings: Normal   Skin:  Skin Findings: Normal    Mental Status:  Mental Status Findings:  Alert and Oriented, Cooperative         Anesthesia Plan  Type of Anesthesia, risks & benefits discussed:  Anesthesia Type:  epidural  Patient's Preference:   Intra-op Monitoring Plan:   Intra-op Monitoring Plan Comments:   Post Op Pain Control Plan:   Post Op Pain Control Plan Comments:   Induction:    Beta Blocker:  Patient is not currently on a  Beta-Blocker (No further documentation required).       Informed Consent: Patient understands risks and agrees with Anesthesia plan.  Questions answered. Anesthesia consent signed with patient.  ASA Score: 2     Day of Surgery Review of History & Physical: I have interviewed and examined the patient. I have reviewed the patient's H&P dated:  There are no significant changes.          Ready For Surgery From Anesthesia Perspective.

## 2017-05-11 NOTE — PLAN OF CARE
Problem: Patient Care Overview  Goal: Plan of Care Review  Outcome: Ongoing (interventions implemented as appropriate)  Pt laboring comfortably with epidural. SROM @ 0200. Maternal VSS, Cat 2 FHT's.

## 2017-05-11 NOTE — PROGRESS NOTES
"Discussed feeding choice with mother.  Reviewed benefits of breastfeeding.  Patient given "What to Expect in the First 48 Hours" handout. Mother states her intention is to breast and formula feed.    Coffective counseling sheet Fall in Love discussed with mother. Reinforced immediate skin to skin, the magic first hour, importance of the first feeding and delaying routine procedures. Encouraged mother to download Coffective mobile maura if she has not already done so. Mother verbalies understanding.  "

## 2017-05-11 NOTE — ANESTHESIA RELEASE NOTE
"Anesthesia Release from PACU Note    Patient: Yuliana Mueller    Procedure(s) Performed: * No procedures listed *    Anesthesia type: epidural    Post pain: Adequate analgesia    Post assessment: no apparent anesthetic complications and tolerated procedure well    Last Vitals:   Visit Vitals    /75    Pulse 77    Temp 37.2 °C (99 °F) (Oral)    Resp 18    Ht 5' 4" (1.626 m)    Wt 96.6 kg (213 lb)    LMP 07/04/2016 (Within Weeks)    Breastfeeding No    BMI 36.56 kg/m2       Post vital signs: stable    Level of consciousness: awake, alert  and oriented    Nausea/Vomiting: no nausea/no vomiting    Complications: none    Airway Patency: patent    Respiratory: unassisted, spontaneous ventilation, room air    Cardiovascular: stable and blood pressure at baseline    Hydration: euvolemic  "

## 2017-05-11 NOTE — SUBJECTIVE & OBJECTIVE
Interval History:  Yuliana is a 36 y.o.  at 40w4d. She is doing well.     Objective:     Vital Signs (Most Recent):  Temp: 98 °F (36.7 °C) (05/10/17 2302)  Pulse: 95 (17 0247)  Resp: 18 (05/10/17 2302)  BP: 124/68 (17 0247) Vital Signs (24h Range):  Temp:  [97.9 °F (36.6 °C)-98 °F (36.7 °C)] 98 °F (36.7 °C)  Pulse:  [] 95  Resp:  [16-18] 18  BP: ()/(41-92) 124/68     Weight: 96.6 kg (213 lb)  Body mass index is 36.56 kg/(m^2).    FHT:150Cat 1 (reassuring)  TOCO:  Q 2-3 minutes    Cervical Exam:  Dilation:  8  Effacement:  75%  Station: -1  Presentation: Vertex     Significant Labs:  Lab Results   Component Value Date    GROUPTRH B POS 05/10/2017    HEPBSAG Negative 2017    STREPBCULT No Group B Streptococcus isolated 2017       I have personallly reviewed all pertinent lab results from the last 24 hours.    Physical Exam:   Constitutional: She is oriented to person, place, and time. She appears well-developed and well-nourished.     Eyes: Conjunctivae are normal. Pupils are equal, round, and reactive to light.    Neck: Normal range of motion.    Cardiovascular: Normal rate and regular rhythm.     Pulmonary/Chest: Breath sounds normal.        Abdominal: Soft.     Genitourinary: Vagina normal and uterus normal.           Musculoskeletal: Normal range of motion and moves all extremeties.       Neurological: She is alert and oriented to person, place, and time.    Skin: Skin is warm.    Psychiatric: She has a normal mood and affect. Her behavior is normal. Thought content normal.

## 2017-05-11 NOTE — TRANSFER OF CARE
"Anesthesia Transfer of Care Note    Patient: Yuliana Mueller    Procedure(s) Performed: * No procedures listed *    Patient location: Labor and Delivery    Anesthesia Type: epidural    Post pain: adequate analgesia    Post assessment: no apparent anesthetic complications    Post vital signs: stable    Level of consciousness: awake, alert and oriented    Nausea/Vomiting: no nausea/vomiting    Complications: none    Transfer of care protocol was followed      Last vitals:   Visit Vitals    /75    Pulse 77    Temp 37.2 °C (99 °F) (Oral)    Resp 18    Ht 5' 4" (1.626 m)    Wt 96.6 kg (213 lb)    LMP 07/04/2016 (Within Weeks)    Breastfeeding No    BMI 36.56 kg/m2     "

## 2017-05-11 NOTE — H&P
[facility] -   Obstetrics  History & Physical    Patient Name: Yuliana Mueller  MRN: [id number]  Admission Date: 5/10/2017  Primary Care Provider: Primary Doctor No    Subjective:     Principal Problem:<principal problem not specified>    History of Present Illness:   36yo IUP at 40w3d    Obstetric HPI:  Patient reports Date/time of onset: 5/10/ at 1600 contractions, active fetal movement, No vaginal bleeding , No loss of fluid     This pregnancy has been complicated by AMA    Obstetric History       T4      TAB0   SAB0   E0   M0   L4       # Outcome Date GA Lbr Kodi/2nd Weight Sex Delivery Anes PTL Lv   5 Current            4 Term      Vag-Spont   Y   3 Term      Vag-Spont   Y   2 Term      Vag-Spont   Y   1 Term      Vag-Spont   Y        No past medical history on file.  No past surgical history on file.    No prescriptions prior to admission.       Review of patient's allergies indicates:  No Known Allergies     Family History     None        Social History Main Topics    Smoking status: Never Smoker    Smokeless tobacco: Never Used    Alcohol use No    Drug use: No    Sexual activity: Yes     Partners: Male     Birth control/ protection: None     Review of Systems   Constitutional: Negative.    HENT: Negative.    Eyes: Negative.    Respiratory: Negative.    Cardiovascular: Negative.    Gastrointestinal: Negative.    Endocrine: Negative.    Genitourinary: Negative.    Musculoskeletal: Negative.    Skin:  Negative.   Neurological: Negative.    Hematological: Negative.    Psychiatric/Behavioral: Negative.    Breast: negative.    All other systems reviewed and are negative.     Objective:     Vital Signs (Most Recent):    Vital Signs (24h Range):           There is no height or weight on file to calculate BMI.    FHT: 136Cat 1 (reassuring)  TOCO:  Q 2-5 minutes    Physical Exam    Cervix:  Dilation:  5  Effacement:  50%  Station: -2  Presentation: Vertex     Significant  Labs:  Lab Results   Component Value Date    GROUPTRH B POS 2017    HEPBSAG Negative 2017    STREPBCULT No Group B Streptococcus isolated 2017       I have personallly reviewed all pertinent lab results from the last 24 hours.    Assessment/Plan:     36 y.o. female  at 40w3d for:    Labor and delivery indication for care or intervention  IUP at 40w3d  Latent labor  AMA  GBS negative  P admit  Pitocin augmentation      Eileen Jimenez CNM  Obstetrics  [facility]

## 2017-05-11 NOTE — L&D DELIVERY NOTE
of female infant in OA position over intact perineum  Infant bulb suctioned, shoulders delivered with ease  Infant to maternal abdomen, cord clamped and cut  Placenta delivered with gentle traction  EBL 250cc   Delivery Information for  Angel Mueller    Birth information:  YOB: 2019   Time of birth: 4:22 AM   Sex: female   Head Delivery Date/Time: 2017  4:22 AM   Delivery type: Vaginal, Spontaneous Delivery   Gestational Age: 40w4d               Assessment    No data filed          Assisted Delivery Details:    Forceps attempted?:  No   Vacuum extractor attempted?:  No             Shoulder Dystocia    Shoulder dystocia present?:  No                                             Presentation and Position    Presentation: Vertex   Position:                    Interventions/Resuscitation         Cord    No data filed              Labor Events:       labor: No     Labor Onset Date/Time:         Dilation Complete Date/Time:         Start Pushing Date/Time:       Rupture Date/Time:              Rupture type:           Fluid Amount:        Fluid Color:        Fluid Odor:        Membrane Status (PeriCalm):        Rupture Date/Time (PeriCalm):        Fluid Amount (PeriCalm):        Fluid Color (PeriCalm):         steroids: None     Antibiotics given for GBS: No     Induction: none     Indications for induction:        Augmentation: oxytocin     Indications for augmentation: Ineffective Contraction Pattern     Labor complications: None     Additional complications:          Cervical ripening:                     Delivery:      Episiotomy: None     Indication for Episiotomy:       Perineal Lacerations: None Repaired:      Periurethral Laceration: none Repaired:     Labial Laceration: none Repaired:     Sulcus Laceration: none Repaired:     Vaginal Laceration: No Repaired:     Cervical Laceration: No Repaired:     Repair suture:       Repair # of packets:       Blood  loss (ml):       Vaginal Sweep Performed:       Surgicount Correct:         Other providers:       Anesthesia    Method:  Epidural              Details (if applicable):  Trial of Labor      Categorization:      Priority:     Indications for :     Incision Type:       Additional  information:  Forceps:    Vacuum:    Breech:    Observed anomalies    Other (Comments):

## 2017-05-11 NOTE — PROGRESS NOTES
[facility]  Obstetrics  Labor Progress Note    Patient Name: Yuliana Mueller  MRN: [id number]  Admission Date: 5/10/2017  Hospital Length of Stay: 1 days  Attending Physician: Ana Foley MD  Primary Care Provider: Primary Doctor No    Subjective:     Principal Problem:<principal problem not specified>    Hospital Course:  Admitted at 4cm    Interval History:  Yuliana is a 36 y.o.  at 40w4d. She is doing well.     Objective:     Vital Signs (Most Recent):  Temp: 98 °F (36.7 °C) (05/10/17 230)  Pulse: 95 (17 0247)  Resp: 18 (05/10/17 2302)  BP: 124/68 (17 024) Vital Signs (24h Range):  Temp:  [97.9 °F (36.6 °C)-98 °F (36.7 °C)] 98 °F (36.7 °C)  Pulse:  [] 95  Resp:  [16-18] 18  BP: ()/(41-92) 124/68     Weight: 96.6 kg (213 lb)  Body mass index is 36.56 kg/(m^2).    FHT:150Cat 1 (reassuring)  TOCO:  Q 2-3 minutes    Cervical Exam:  Dilation:  8  Effacement:  75%  Station: -1  Presentation: Vertex     Significant Labs:  Lab Results   Component Value Date    GROUPTRH B POS 05/10/2017    HEPBSAG Negative 2017    STREPBCULT No Group B Streptococcus isolated 2017       I have personallly reviewed all pertinent lab results from the last 24 hours.    Physical Exam:   Constitutional: She is oriented to person, place, and time. She appears well-developed and well-nourished.     Eyes: Conjunctivae are normal. Pupils are equal, round, and reactive to light.    Neck: Normal range of motion.    Cardiovascular: Normal rate and regular rhythm.     Pulmonary/Chest: Breath sounds normal.        Abdominal: Soft.     Genitourinary: Vagina normal and uterus normal.           Musculoskeletal: Normal range of motion and moves all extremeties.       Neurological: She is alert and oriented to person, place, and time.    Skin: Skin is warm.    Psychiatric: She has a normal mood and affect. Her behavior is normal. Thought content normal.       Assessment/Plan:     36  y.o. female  at 40w4d for:    Labor and delivery indication for care or intervention  IUP at 40w3d  Latent labor  AMA  GBS negative  P admit  Pitocin at 8mu  Will monitor and support        Eileen Jimenez CNM  Obstetrics  [facility]

## 2017-05-11 NOTE — SUBJECTIVE & OBJECTIVE
Obstetric HPI:  Patient reports Date/time of onset: 5/10/ at 1600 contractions, active fetal movement, No vaginal bleeding , No loss of fluid     This pregnancy has been complicated by AMA    Obstetric History       T4      TAB0   SAB0   E0   M0   L4       # Outcome Date GA Lbr Kodi/2nd Weight Sex Delivery Anes PTL Lv   5 Current            4 Term      Vag-Spont   Y   3 Term      Vag-Spont   Y   2 Term      Vag-Spont   Y   1 Term      Vag-Spont   Y        No past medical history on file.  No past surgical history on file.    No prescriptions prior to admission.       Review of patient's allergies indicates:  No Known Allergies     Family History     None        Social History Main Topics    Smoking status: Never Smoker    Smokeless tobacco: Never Used    Alcohol use No    Drug use: No    Sexual activity: Yes     Partners: Male     Birth control/ protection: None     Review of Systems   Constitutional: Negative.    HENT: Negative.    Eyes: Negative.    Respiratory: Negative.    Cardiovascular: Negative.    Gastrointestinal: Negative.    Endocrine: Negative.    Genitourinary: Negative.    Musculoskeletal: Negative.    Skin:  Negative.   Neurological: Negative.    Hematological: Negative.    Psychiatric/Behavioral: Negative.    Breast: negative.    All other systems reviewed and are negative.     Objective:     Vital Signs (Most Recent):    Vital Signs (24h Range):           There is no height or weight on file to calculate BMI.    FHT: 136Cat 1 (reassuring)  TOCO:  Q 2-5 minutes    Physical Exam    Cervix:  Dilation:  5  Effacement:  50%  Station: -2  Presentation: Vertex     Significant Labs:  Lab Results   Component Value Date    GROUPTRH B POS 2017    HEPBSAG Negative 2017    STREPBCULT No Group B Streptococcus isolated 2017       I have personallly reviewed all pertinent lab results from the last 24 hours.

## 2017-05-11 NOTE — ANESTHESIA POSTPROCEDURE EVALUATION
"Anesthesia Post Evaluation    Patient: Yuliana Mueller    Procedure(s) Performed: * No procedures listed *    Final Anesthesia Type: epidural  Patient location during evaluation: labor & delivery  Patient participation: Yes- Able to Participate  Level of consciousness: awake and alert  Post-procedure vital signs: reviewed and stable  Pain management: adequate  Airway patency: patent  PONV status at discharge: No PONV  Anesthetic complications: no      Cardiovascular status: blood pressure returned to baseline  Respiratory status: unassisted, spontaneous ventilation and room air  Hydration status: euvolemic  Follow-up not needed.        Visit Vitals    /75    Pulse 77    Temp 37.2 °C (99 °F) (Oral)    Resp 18    Ht 5' 4" (1.626 m)    Wt 96.6 kg (213 lb)    LMP 07/04/2016 (Within Weeks)    Breastfeeding No    BMI 36.56 kg/m2       Pain/Marisa Score: Pain Rating Prior to Med Admin: 4 (5/11/2017  3:01 AM)      "

## 2017-05-11 NOTE — ANESTHESIA PROCEDURE NOTES
Epidural    Patient location during procedure: OB   Reason for block: primary anesthetic   Diagnosis: iup   Start time: 5/10/2017 10:28 PM  Timeout: 5/10/2017 10:28 PM  Staffing  Anesthesiologist: ANMOL FLOR  Resident/CRNA: ANJELICA FARIAS  Performed by: anesthesiologist   Preanesthetic Checklist  Completed: patient identified, pre-op evaluation, timeout performed, IV checked, risks and benefits discussed, monitors and equipment checked, anesthesia consent given, hand hygiene performed and patient being monitored  Preparation  Patient position: sitting  Prep: Betadine  Patient monitoring: Pulse Ox and Blood Pressure  Epidural  Skin Anesthetic: lidocaine 1%  Skin Wheal: 3 mL  Administration type: continuous  Approach: midline  Interspace: L3-4  Injection technique: DARIAN air  Needle and Epidural Catheter  Needle type: Tuohy   Needle gauge: 18  Needle length: 3.5 inches  Needle insertion depth: 6.5 cm  Catheter type: multi-orifice  Catheter size: 20 G  Catheter at skin depth: 11 cm  Test dose: 3 mL of lidocaine 1.5% with Epi 1-to-200,000  Additional Documentation: incremental injection, negative aspiration for heme and CSF, no signs/symptoms of IV or SA injection, no paresthesia on injection, no significant pain on injection and no significant complaints from patient  Needle localization: anatomical landmarks  Medications:  Bolus administered: 14 mL of 0.2% ropivacaine  Assessment  Ease of block: easy  Patient's tolerance of the procedure: comfortable throughout block

## 2017-05-11 NOTE — LACTATION NOTE
"Lactation Rounds:    1 void and 1 stool reflected per chart. MARTII at bedside being used for translation. Mother has infant latched in cradle position on left breast. Infant noted to have shallow latch with poor positioning. Audible swallow heard. Encouraged position and latch adjustment to facilitate deep latch. Mother not receptive to position changes, assisted in football hold on left breast, mother uncomfortable. Switched to cross cradle on right breast. Mother switching back to cradle position. Infant with shallow latch. Attempted to obtain deep latch. Infant unable to maintain deep latch. Mother instructed on supporting infants head and breast, mother not receptive to teaching. Nipple shape and color bilaterally wnl. Hand expression and nipple care demonstrated. Mother able to return demonstrate. Primary nurse updated. Mother instructed to call lactation as needed for assistance.     Lactation packet given and admit information reviewed. Mother verbalizes understanding of expected  behaviors and output for the first 48 hours of life.  Discussed the importance of cue based feedings on demand, unrestricted access to the breast, and frequent uninterrupted skin to skin contact.  Risk and implications of artificial nipples and supplementation discussed.  Encouraged mother to call for assistance when desired or when infant is showing signs of hunger, contact number provided, mother verbalizes understanding.       17 1345   Infant Assessment   Number of Stools (24 hours) 1   Number of Voids (24 hours) 1   Maternal Infant Feeding   Infant Positioning cradle;clutch/"football"   Signs of Milk Transfer audible swallow;infant jaw motion present   Presence of Pain no   Nipple Shape After Feeding, Left wdl   Nipple Shape After Feeding, Right wdl   Breastfeeding Education adequate milk volume;importance of skin-to-skin contact   Breastfeeding History   Currently Breastfeeding yes   Feeding Infant   Audible " Swallow yes   Lactation Referrals   Lactation Consult Breastfeeding assessment;Initial assessment   Lactation Interventions   Attachment Promotion skin-to-skin contact encouraged;rooming-in promoted;privacy provided;role responsibility promoted;infant-mother separation minimized;family involvement promoted;face-to-face positioning promoted;environment adjusted;counseling provided;breastfeeding assistance provided   Breastfeeding Assistance support offered;feeding on demand promoted;feeding session observed;both breasts offered each feeding;assisted with positioning;infant suck/swallow verified;infant latch-on verified   Maternal Breastfeeding Support maternal rest encouraged;maternal nutrition promoted;maternal hydration promoted;lactation counseling provided;infant-mother separation minimized;encouragement offered;diary/feeding log utilized   Latch Promotion suck stimulated with colostrum drop;infant moved to breast;positioning assisted

## 2017-05-11 NOTE — PLAN OF CARE
Problem: Patient Care Overview  Goal: Plan of Care Review  Outcome: Ongoing (interventions implemented as appropriate)  Pt progressing well. Vital signs stable. Pain well controlled with PO meds. Voiding without difficulty. Ambulating ad michael. Breastfeeding and bonding well with infant.

## 2017-05-12 VITALS
DIASTOLIC BLOOD PRESSURE: 50 MMHG | SYSTOLIC BLOOD PRESSURE: 100 MMHG | RESPIRATION RATE: 18 BRPM | HEIGHT: 64 IN | TEMPERATURE: 99 F | HEART RATE: 74 BPM | WEIGHT: 213 LBS | BODY MASS INDEX: 36.37 KG/M2

## 2017-05-12 PROCEDURE — 25000003 PHARM REV CODE 250: Performed by: ADVANCED PRACTICE MIDWIFE

## 2017-05-12 RX ORDER — IBUPROFEN 600 MG/1
600 TABLET ORAL EVERY 6 HOURS
Qty: 30 TABLET | Refills: 0 | Status: SHIPPED | OUTPATIENT
Start: 2017-05-12 | End: 2017-06-09

## 2017-05-12 RX ADMIN — IBUPROFEN 600 MG: 600 TABLET, FILM COATED ORAL at 12:05

## 2017-05-12 RX ADMIN — DOCUSATE SODIUM 100 MG: 100 CAPSULE, LIQUID FILLED ORAL at 08:05

## 2017-05-12 RX ADMIN — IBUPROFEN 600 MG: 600 TABLET, FILM COATED ORAL at 06:05

## 2017-05-12 NOTE — LACTATION NOTE
This note was copied from a baby's chart.  When collecting times baby ate during shift, no feeding sheet present in room. Through translation was able to obtain from mom that baby ate at the breast for most of the night. Baby also had a bottle overnight.

## 2017-05-12 NOTE — PROGRESS NOTES
Pt d/c instructions given. All concerns and questions answered. Pt voiced understanding of d/c instructions.

## 2017-05-12 NOTE — DISCHARGE INSTRUCTIONS
Después de un parto vaginal  Después del nacimiento de templeton bebé, es posible que templeton cuerpo esté muy cansado. Recuperarse de un parto vaginal puede llevar tiempo. Quizás usted permanezca en el hospital o centro de maternidad entre les y cuatro días. También es posible que pueda regresar a templeton casa el mismo día.    Inmediatamente después del parto  Le tomarán la temperatura y la presión arterial hasta que se le estabilicen. Jelly enfermera u otro proveedor de atención médica la observará mientras usted descansa. Quizás tenga roberto del posparto, que son cólicos causados por el encogimiento del útero. Se usan toallas sanitarias para absorber el flujo del revestimiento uterino. Para garantizar que no esté sangrando demasiado, le revisarán la toalla sanitaria. Además, jelly enfermera le examinará la firmeza del útero aplicándole jelly presión suave en el estómago.  Si le pusieron anestesia, la vigilarán estrechamente hasta que usted pueda sentir y  los dedos de los pies. Si tiene dolor perineal (en la zion entre la vagina y el ano), podrá aliviárselo con jelly compresa de hielo.  El cuidado del recién nacido  Mientras usted se encuentre todavía en el hospital o centro de maternidad, le enseñarán a cargar a templeton bebé en brazos y a alimentarlo. También le darán consejos sobre los cuidados de templeton recién nacido, cómo bañar al bebé e instrucciones para amamantarlo.  Preparativos para regresar a templeton casa  Quizás esté ansiosa por irse a templeton casa lo antes posible. Antes de que la dejen irse con templeton recién nacido, un proveedor de atención médica la examinará para garantizar que usted esté lo suficientemente luis enrique feli para cuidar de templeton propia albino y la de templeton bebé. Estará lista para regresar a casa cuando:  · Pueda caminar hasta el baño y usarlo sin ayuda.  · Pueda comer alimentos sólidos y tragar pastillas (si las necesita).  · No tenga señales de infección ni de otros problemas de albino, feli fiebre.  · Tenga un adecuado control del  dolor.  · Templeton sangrado no sea excesivo.  · Pueda cuidar de templeton recién nacido y esté emocionalmente estable.  Antes de salir del hospital o centro de maternidad, le darán instrucciones sobre cómo cuidar de usted en templeton hogar después de un parto vaginal. Asegúrese de seguir estas instrucciones atentamente. Si tiene preguntas o preocupaciones, hable de eso con templeton proveedor ahora.  Si le pusieron puntos  Es posible que le hayan puesto puntos en la piel cercana a templeton vagina. Los puntos pueden walt cerrado jelly episiotomía (incisión que agranda la abertura de la vagina) o reparado piel desgarrada. De cualquier forma, los puntos deberían disolvérsele en unas cuantas semanas. Hasta nneka momento, mantenga limpios los puntos para ayudar a aliviar las molestias, facilitar la cicatrización y reducir templeton riesgo de infecciones. Podrían servirle los siguientes consejos:  · Después de orinar o evacuar jesse intestinos, límpiese desde adelante hacia atrás.  · Después de limpiarse, rocíese la zion con agua tibia. O jolanta, tome un baño de asiento. Chelan significa sentarse en jelly jamel que contiene unas cuantas pulgadas de agua tibia. Por último, séquese la zion con palmaditas o con un secador de pelo en un ajuste de baja temperatura.  · No use jabón ni ninguna solución excepto agua en la zion.  · Puede ducharse a menos que le digan que no lo saray.  · Cámbiese las toallas sanitarias feli mínimo cada dos a cuatro horas.  · Aplique compresas frías o calientes en la zion, según le hayan indicado jesse proveedores de atención médica o enfermeras. Póngase jelly toalla delgada entre la compresa y la piel.  · Siéntese en asientos firmes para que los puntos se estiren menos.  Visita de control posnatal  Programe jelly visita de control posnatal con templeton proveedor de atención médica para unas seis semanas después del parto. Sanjeev alyssia examen le examinarán el útero y la zion vaginal. Póngase en contacto con templeton proveedor de atención médica si piensa que usted o  templeton bebé tienen algún problema.  Cuándo llamar a templeton proveedor de atención médica  Llame inmediatamente a templeton proveedor de atención médica si tiene alguno de los siguientes síntomas:  · Fiebre de 100.4° F (38.0º C) o más kavon.  · Sangrado que empapa jelly nueva toalla sanitaria después de jelly hora o que contiene coágulos grandes.  · Dolor en la vagina que empeora y que no se yasir con los medicamentos.  · Enrojecimiento, flujo o más dolor a causa del desgarro vaginal o la episiotomía.  · Ardor, dolor, laurent valenzuela o jelly zion con nódulos en los senos junto, quizás, con síntomas similares a los de la gripe.  · Grietas, ampollas o ani en los pezones.  · Ardor o dolor al orinar.  · Náusea o vómito.  · Mareos o desmayos.  · Sensación de tristeza extrema o ansiedad, o falta de ganas de estar con templeton bebé.  · Dolor abdominal que no se yasir con medicamentos.  · Flujo vaginal maloliente.  · Ninguna evacuación de los intestinos en chris días.  · Dolor al orinar o incapacidad de controlar la orina.  · Enrojecimiento, sensación de calor o dolor en la parte inferior de la pierna.  · Dolor en el pecho.      Date Last Reviewed: 8/2/2015  © 2955-3886 [employer]. [address] [place] [address], [place] [address]. Todos los derechos reservados. Esta información no pretende sustituir la atención médica profesional. Sólo templeton médico puede diagnosticar y tratar un problema de albino.

## 2017-05-12 NOTE — NURSING
MARY ALICE in pt room. Spoke with  Aster ID number [id number] to assess pt and baby. No issues noted currently.

## 2017-05-12 NOTE — PLAN OF CARE
Problem: Patient Care Overview  Goal: Plan of Care Review  Outcome: Ongoing (interventions implemented as appropriate)  MARTTI at bedside. Pt progressing well. No issues noted currently; no c/o pain or discomfort. Fundus firm with light to moderate lochia. Ambulating and voiding without difficulty. Family at bedside. Vitals stable. Will continue to monitor.

## 2017-05-12 NOTE — LACTATION NOTE
Lactation rounds  Mother states that breastfeeding is going well. Infant weight loss and output is WDL. Encouraged mother to call for assistance when desired or when infant is showing signs of hunger, contact number provided, mother verbalizes understanding.     05/12/17 1100   Infant Assessment   Weight Loss (%) 1.4   Number of Stools (24 hours) 2   Number of Voids (24 hours) 3   Lactation Interventions   Attachment Promotion skin-to-skin contact encouraged;privacy provided;infant-mother separation minimized;family involvement promoted;counseling provided;breastfeeding assistance provided;environment adjusted;face-to-face positioning promoted;rooming-in promoted;role responsibility promoted   Breast Care: Breastfeeding manual expression to soften breast;milk massaged towards nipple   Breastfeeding Assistance assisted with positioning;both breasts offered each feeding;feeding cue recognition promoted;feeding on demand promoted;support offered   Maternal Breastfeeding Support encouragement offered;infant-mother separation minimized;lactation counseling provided

## 2017-05-12 NOTE — DISCHARGE SUMMARY
[facility] -   Obstetrics  Discharge Summary      Patient Name: Yuliana Mueller  MRN: [id number]  Admission Date: 5/10/2017  Hospital Length of Stay: 2 days  Discharge Date and Time:  2017 6:54 AM  Attending Physician: Ana Foley MD   Discharging Provider: Nargis Argueta CNM  Primary Care Provider: Primary Doctor No    HPI:  34yo IUP at 40w3d    * No surgery found *     Hospital Course:    17 @0422        Final Active Diagnoses:    Diagnosis Date Noted POA    PRINCIPAL PROBLEM:   (normal spontaneous vaginal delivery) [O80] 2017 Not Applicable    Single live birth [Z37.0] 2017 Not Applicable    Labor and delivery indication for care or intervention [O75.9] 05/10/2017 Yes      Problems Resolved During this Admission:    Diagnosis Date Noted Date Resolved POA            Feeding Method: bottle    Immunizations     Date Immunization Status Dose Route/Site Given by    17 0535 MMR Incomplete 0.5 mL Subcutaneous/Left deltoid     17 0535 Tdap Incomplete 0.5 mL Intramuscular/Left deltoid           Delivery:    Episiotomy: None   Lacerations: None   Repair suture: None   Repair # of packets:     Blood loss (ml): 250     Birth information:  YOB: 2019   Time of birth: 4:22 AM   Sex: female   Delivery type: Vaginal, Spontaneous Delivery   Gestational Age: 40w4d    Delivery Clinician:      Other providers:       Additional  information:  Forceps:    Vacuum:    Breech:    Observed anomalies      Living?:           APGARS  One minute Five minutes Ten minutes   Skin color:         Heart rate:         Grimace:         Muscle tone:         Breathing:         Totals: 8  9        Placenta: Delivered:       appearance    Pending Diagnostic Studies:     None          Discharged Condition: good    Disposition:     Follow Up:  Follow-up Information     Follow up In 4 weeks.        Patient Instructions:   No discharge procedures on  file.  Medications:  Current Discharge Medication List      START taking these medications    Details   ibuprofen (ADVIL,MOTRIN) 600 MG tablet Take 1 tablet (600 mg total) by mouth every 6 (six) hours.  Qty: 30 tablet, Refills: 0             Nargis Argueta CNM  Obstetrics  [facility] [place]

## 2017-06-09 ENCOUNTER — POSTPARTUM VISIT (OUTPATIENT)
Dept: OBSTETRICS AND GYNECOLOGY | Facility: CLINIC | Age: 37
End: 2017-06-09
Payer: MEDICAID

## 2017-06-09 VITALS
WEIGHT: 196.19 LBS | DIASTOLIC BLOOD PRESSURE: 80 MMHG | BODY MASS INDEX: 33.68 KG/M2 | SYSTOLIC BLOOD PRESSURE: 110 MMHG

## 2017-06-09 PROBLEM — O09.33 LATE PRENATAL CARE AFFECTING PREGNANCY IN THIRD TRIMESTER: Status: RESOLVED | Noted: 2017-02-01 | Resolved: 2017-06-09

## 2017-06-09 PROBLEM — O09.523 ELDERLY MULTIGRAVIDA IN THIRD TRIMESTER: Status: RESOLVED | Noted: 2017-02-01 | Resolved: 2017-06-09

## 2017-06-09 PROCEDURE — 0503F POSTPARTUM CARE VISIT: CPT | Mod: ,,, | Performed by: OBSTETRICS & GYNECOLOGY

## 2017-06-09 PROCEDURE — 99212 OFFICE O/P EST SF 10 MIN: CPT | Mod: PBBFAC,PO | Performed by: OBSTETRICS & GYNECOLOGY

## 2017-06-09 PROCEDURE — 99999 PR PBB SHADOW E&M-EST. PATIENT-LVL II: CPT | Mod: PBBFAC,,, | Performed by: OBSTETRICS & GYNECOLOGY

## 2017-06-09 NOTE — PROGRESS NOTES
CC: Post-partum follow-up    Yuliana Mueller is a 36 y.o. female  who presents for post-partum visit.  She is S/P a .  She and the baby are doing well.  No pain.  No fever.   No bowel / bladder complaints.    Delivery Date: May 11, 2017  Delivery MD: SELENE Jimenez CNM  Gender: female  Birth Weight: 5 pounds 9 ounces  Breast Feeding: YES  Depression: NO  Contraception: no method    Pregnancy was complicated by:  AMA, Late Prenatal care    /80   Wt 89 kg (196 lb 3.4 oz)   LMP 2016 (Within Weeks)   BMI 33.68 kg/m²     ROS:  GENERAL: No fever, chills, fatigability.  CARDIOVASCULAR: No chest pain. No shortness of breath. No leg cramps.  BREAST: Denies pain. No lumps. No discharge.  ABDOMEN: No abdominal pain. Denies nausea. Denies vomiting. No diarrhea. No constipation  URINARY: No incontinence, no nocturia, no frequency and no dysuria.  VULVAR: No pain, no lesions and no itching.  VAGINAL: No relaxation, no itching, no discharge, no abnormal bleeding and no lesions.  NEUROLOGICAL: No headaches. No vision changes.    PHYSICAL EXAM:  GENERAL: Alert and oriented in no distress  CHEST: Clear to auscultation  CV; Regular rate and rhythm  ABDOMEN:  Soft, non-tender, non-distended  WOUND: Healed well  VULVA:  Normal, no lesions  CERVIX:  Without lesions, polyps or tenderness.  UTERUS:  Normal size, shape, consistency, no mass or tenderness.  ADNEXA:  Normal in size without mass or tenderness  EXTREMITIES: Non-tender, Negative Justice's    IMP:  Doing well S/P   Instructions / precautions reviewed  Contraceptive counseling - Pt desires IUD, however, has lost insurance coverage; will follow up with Family Planning      PLAN:  May resume normal activities  Return: pt will follow up with Family Planning